# Patient Record
Sex: MALE | Race: WHITE | NOT HISPANIC OR LATINO | Employment: OTHER | URBAN - METROPOLITAN AREA
[De-identification: names, ages, dates, MRNs, and addresses within clinical notes are randomized per-mention and may not be internally consistent; named-entity substitution may affect disease eponyms.]

---

## 2023-10-06 NOTE — PROGRESS NOTES
Staff Physician: Lakia Tavarez MD PhD  Referring Physician: [email or web address]  Date of Service: 10/10/2023    RADIATION ONCOLOGY CONSULT NOTE    IDENTIFYING DATA:   Cancer Staging   Malignant neoplasm of prostate (CMS/HCC)  Staging form: Prostate, AJCC 8th Edition  - Clinical stage from 7/12/2023: Provider-entered Stage IIB (cT1c, cN0, PSA: 4.4, Grade Group: 2) - Signed by Lakia Tavarez MD PhD on 10/10/2023    Problem List Items Addressed This Visit       Malignant neoplasm of prostate (CMS/HCC) - Primary    Relevant Orders    Prostate Specific Antigen, Screen (Completed)     Mr. Thiago Paz is a 68-year-old with newly diagnosed prostate adenocarcinoma, self-referred, for evaluation and discussion of treatment recommendations.    3/29/2023 PSA 4.37    6/28/2023 MRI prostate noted a ~33cc prostate, with PI-RADS 4 lesion noted in the right posterolateral PZ, without evidence of gross MANDI, SVI, or abnormal lymphadenopathy    7/12/2023 MR targeted biopsy by Dr. Ni  noted GG2 (6/15 cores+, 0/1 targeted core+, no evidence of cribriform).     HISTORY OF PRESENT ILLNESS:  Today, Mr. Thiago Paz is here by himself. He reports that options discussed with him thus far include RP and brachytherapy at [facility]. He is specifically asking about the use of protons.     Symptomatically, he reports:    1.  Full independence in activities of daily living. Helps take care of his +89yo mother.  2.  No dyspnea on exertion.   3.  Normal appetite. No unintentional weight loss.   4.  Pain score: 0/10   5.  IPSS (International Prostate Symptom Score):   7 / 35, bother 1   - He is not experiencing urinary incontinence.    - He is not experiencing dysuria, hematuria, flank pain.   6.  Sexual function (JASKARAN) Score:  24 / 25    - Use of erectile dysfunction medications:  none  7.  Bowel function:  normal   - Denies hematochezia or pain.    - He does no have a history of hemorrhoids.    - He does not have a history of inflammatory bowel disease (Crohn's,  Ulcerative Colitis).    PAST MEDICAL HISTORY:  Past Medical History:   Diagnosis Date    A-fib (CMS/HCC)     GERD (gastroesophageal reflux disease)     Hypercholesteremia     Macular degeneration     Prostate cancer (CMS/HCC)      PAST SURGICAL HISTORY:  History reviewed. No pertinent surgical history.  ALLERGIES:  No Known Allergies  MEDICATIONS:    Current Outpatient Medications:     apixaban (Eliquis) 2.5 mg tablet, Take 1 tablet (2.5 mg) by mouth 2 times a day., Disp: , Rfl:     atorvastatin (Lipitor) 40 mg tablet, Take 1 tablet (40 mg) by mouth once daily., Disp: , Rfl:     DULoxetine (Cymbalta) 30 mg DR capsule, Take 1 capsule (30 mg) by mouth every other day. Do not crush or chew., Disp: , Rfl:     DULoxetine (Cymbalta) 60 mg DR capsule, Take 1 capsule (60 mg) by mouth every other day. Do not crush or chew., Disp: , Rfl:     metoprolol tartrate (Lopressor) 25 mg tablet, Take 1 tablet (25 mg) by mouth 2 times a day., Disp: , Rfl:     pantoprazole (ProtoNix) 20 mg EC tablet, Take 1 tablet (20 mg) by mouth once daily in the morning. Take before meals. Do not crush, chew, or split., Disp: , Rfl:     zolpidem (Ambien) 5 mg tablet, Take 1 tablet (5 mg) by mouth as needed at bedtime for sleep., Disp: , Rfl:    SOCIAL HISTORY:  Social History     Tobacco Use    Smoking status: Never    Smokeless tobacco: Never   Substance Use Topics    Alcohol use: Never     FAMILY HISTORY:  Family History   Problem Relation Name Age of Onset    Breast cancer Mother      Prostate cancer Father      Cancer Sister          doesn't know the type of cancer     REVIEW OF SYSTEMS:  A 10 point review of systems was reviewed with pertinent positives and negatives noted in HPI. All other systems have been reviewed and are negative.    RADIATION SCREENING QUESTIONS:  Prior radiation therapy: No  Pacemaker: No  Other implantable devices: No  Connective tissue disease: No    PHYSICAL EXAMINATION:  /80 (BP Location: Right arm, Patient  "Position: Sitting, BP Cuff Size: Large adult)   Pulse 74   Temp 36.3 °C (97.3 °F) (Skin)   Resp 18   Ht 1.803 m (5' 11\")   Wt 94.9 kg (209 lb 3.5 oz)   SpO2 96%   BMI 29.18 kg/m²     Constitutional: well developed, no distress, alert & cooperative  Eyes: pupils equal round and reactive to light, extraocular movements intact  Respiratory: normal work of breathing  Cardiovascular: regular, rate and rhythm  GI: abdomen is soft, non-tender, non-distended  : deferred   MSK: range of motion intact, no joint swelling, normal strength.   Extremities: no clubbing or edema.   Neurologic: alert and oriented. Cranial nerves II-XII grossly intact. Sensation intact, motor strength symmetric.    Psychological: normal affect      PERFORMANCE STATUS:  KPS/ECO, Fully active, able to carry on all pre-disease performed without restriction (ECOG equivalent 0)    LABORATORY AND IMAGING DATA:  Imaging: All imaging was personally reviewed and interpreted in clinic. Findings as per HPI and EMR.    Laboratory/Pathology:  All pertinent labs and pathology were personally reviewed and interpreted in clinic. Findings as per HPI and EMR.      2023 from [facility]    A. Prostate, left base, core biopsy:  - Prostatic adenocarcinoma, Winchester score 3+3=6 (Grade Group 1), involving 1 of 2 cores (1 mm tumor length, 5%).    B. Prostate, left mid, core biopsy:  - Prostatic adenocarcinoma, Swati score 3+3=6 (Grade Group 1), involving 1 of 2 cores (1 mm tumor length, 5%, with atypical glands/atypical small acinar proliferation in the other core).    C. Prostate, left apex, core biopsy:  - Benign prostatic tissue.    D. Prostate, right base, core biopsy:  - Prostatic adenocarcinoma, Winchester score 3+4=7 (Grade Group 2), involving 2 of 2 cores (5 mm tumor length, 45%; 6 mm tumor length, 50%).  - Percentage of Winchester pattern 4 = 20%.  - Borderline histology present (small cribriform).    E. Prostate, right mid, core biopsy:  - Prostatic " adenocarcinoma, Swati score 3+4=7 (Grade Group 2), involving 1 of 2 cores (6 mm tumor length, 60%).  - Percentage of Swati pattern 4 = 15%.    F. Prostate, right apex, core biopsy:  - Prostatic adenocarcinoma, Swati score 3+4=7 (Grade Group 2), involving 1 of 2 cores (8 mm tumor length, 80%).  - Percentage of Swati pattern 4 = 15%.    G. Prostate, target 1 right mid/base posterolateral peripheral zone, core biopsy:  - Benign prostatic tissue.      Prostate Cancer Biopsy Summary    Number of cores examined: 15  Number of cores positive: 6  Highest Grade Group: 2  Highest % of core involvement: 60% (6 mm)  Unfavorable histology: Absent  Borderline histology: Present  Large cribriform pattern 4: Absent  Intraductal carcinoma: Absent   ---    IMPRESSION:  67yo gentleman with a history of prostate adenocarcinoma, iPSA 4.37, GG2 (6/15 cores+, 0/1 targeted core+, no evidence of cribriform).     He falls into intermediate favorable risk prostate cancer based on his clinical-pathologic factors. He is not in any pain.    PLAN:  Per SSA tables, he has at least 10 years of life expectancy.   We discussed the recent update to the ProtecT randomized trial that compared 15-year outcomes for prostate cancer patients managed with active surveillance or definitive therapy and found that, while overall survival was no different, there were higher rates of distant metastases and disease progression with active surveillance (NEJ 2023), albeit the AS on that trial was less rigorous. He is a candidate for definitive management, including either surgery or radiation. We discussed that AS involves a secondary confirmatory biopsy, followed by routine PSA check, with surveillance imaging and repeat biopsies no more than once a year or if concern for progression by PSA.      I explained the logistics of radiation therapy, including CT simulation, the use of daily image guidance to improve prostate localization, and the recommendation  of fiducials and spaceOAR placement to reduce potential toxic rectal toxicities.  Potential acute and long-term side effects of radiation were discussed, including acute and late side effects, including toxicities to bladder, rectum, bowel, urethra, erectile dysfunction, infertility and risk of secondary malignancy.  I discussed various EBRT treatment regimens, including moderate hypofractionation over 20 treatments, ultra hypofractionation over 5 treatments (his IPSS is 7). He also asked about protons and I discussed the pros/cons of proton beam, including Forrest peak effect as well as range uncertainty and relative biological effectiveness and its variation across the spread-out Forrest peak.  Clinical outcomes suggest efficacy and toxicity of protons either advantageous or similar compared to intensity-modulated photon beam radiation but no randomized trial results are yet available.     He would like some time to consider his options before making a decision. We will re-check PSA today (previous was in 03/2023), and he will let us know his decision.     - check PSA today  - patient will call with his treatment decision     Lakia Tavarez MD PhD  , Radiation Oncology   Well appearing, awake, alert, oriented to person, place, time/situation and in no apparent distress. normal...

## 2023-10-10 ENCOUNTER — HOSPITAL ENCOUNTER (OUTPATIENT)
Dept: RADIATION ONCOLOGY | Facility: HOSPITAL | Age: 68
Setting detail: RADIATION/ONCOLOGY SERIES
Discharge: STILL ADMITTED OR ADMINISTRATIVE | End: 2023-10-10
Payer: MEDICARE

## 2023-10-10 ENCOUNTER — LAB (OUTPATIENT)
Dept: LAB | Facility: HOSPITAL | Age: 68
End: 2023-10-10
Payer: MEDICARE

## 2023-10-10 VITALS
OXYGEN SATURATION: 96 % | DIASTOLIC BLOOD PRESSURE: 80 MMHG | HEART RATE: 74 BPM | HEIGHT: 71 IN | WEIGHT: 209.22 LBS | TEMPERATURE: 97.3 F | BODY MASS INDEX: 29.29 KG/M2 | SYSTOLIC BLOOD PRESSURE: 129 MMHG | RESPIRATION RATE: 18 BRPM

## 2023-10-10 DIAGNOSIS — C61 MALIGNANT NEOPLASM OF PROSTATE (MULTI): Primary | ICD-10-CM

## 2023-10-10 DIAGNOSIS — C61 MALIGNANT NEOPLASM OF PROSTATE (MULTI): ICD-10-CM

## 2023-10-10 LAB — PSA SERPL-MCNC: 3.7 NG/ML

## 2023-10-10 PROCEDURE — 84153 ASSAY OF PSA TOTAL: CPT | Performed by: STUDENT IN AN ORGANIZED HEALTH CARE EDUCATION/TRAINING PROGRAM

## 2023-10-10 PROCEDURE — 99205 OFFICE O/P NEW HI 60 MIN: CPT | Performed by: STUDENT IN AN ORGANIZED HEALTH CARE EDUCATION/TRAINING PROGRAM

## 2023-10-10 PROCEDURE — 36415 COLL VENOUS BLD VENIPUNCTURE: CPT

## 2023-10-10 PROCEDURE — 99215 OFFICE O/P EST HI 40 MIN: CPT | Mod: PO | Performed by: STUDENT IN AN ORGANIZED HEALTH CARE EDUCATION/TRAINING PROGRAM

## 2023-10-10 RX ORDER — PANTOPRAZOLE SODIUM 20 MG/1
20 TABLET, DELAYED RELEASE ORAL
COMMUNITY

## 2023-10-10 RX ORDER — ATORVASTATIN CALCIUM 40 MG/1
40 TABLET, FILM COATED ORAL DAILY
COMMUNITY

## 2023-10-10 RX ORDER — DULOXETIN HYDROCHLORIDE 60 MG/1
60 CAPSULE, DELAYED RELEASE ORAL EVERY OTHER DAY
COMMUNITY

## 2023-10-10 RX ORDER — DULOXETIN HYDROCHLORIDE 30 MG/1
30 CAPSULE, DELAYED RELEASE ORAL EVERY OTHER DAY
COMMUNITY

## 2023-10-10 RX ORDER — METOPROLOL TARTRATE 25 MG/1
25 TABLET, FILM COATED ORAL 2 TIMES DAILY
COMMUNITY

## 2023-10-10 RX ORDER — ZOLPIDEM TARTRATE 5 MG/1
5 TABLET ORAL NIGHTLY PRN
COMMUNITY

## 2023-10-10 ASSESSMENT — PAIN SCALES - GENERAL: PAINLEVEL: 0-NO PAIN

## 2023-10-10 ASSESSMENT — PATIENT HEALTH QUESTIONNAIRE - PHQ9
2. FEELING DOWN, DEPRESSED OR HOPELESS: NOT AT ALL
SUM OF ALL RESPONSES TO PHQ9 QUESTIONS 1 AND 2: 0
1. LITTLE INTEREST OR PLEASURE IN DOING THINGS: NOT AT ALL

## 2023-10-10 ASSESSMENT — COLUMBIA-SUICIDE SEVERITY RATING SCALE - C-SSRS
2. HAVE YOU ACTUALLY HAD ANY THOUGHTS OF KILLING YOURSELF?: NO
1. IN THE PAST MONTH, HAVE YOU WISHED YOU WERE DEAD OR WISHED YOU COULD GO TO SLEEP AND NOT WAKE UP?: NO
6. HAVE YOU EVER DONE ANYTHING, STARTED TO DO ANYTHING, OR PREPARED TO DO ANYTHING TO END YOUR LIFE?: NO

## 2023-10-27 DIAGNOSIS — C61 CANCER OF PROSTATE WITH INTERMEDIATE RECURRENCE RISK, STAGE T2B-C OR GLEASON 7 OR PROSTATE-SPECIFIC ANTIGEN (PSA) 10-20 (MULTI): Primary | ICD-10-CM

## 2023-10-30 PROBLEM — C61: Status: ACTIVE | Noted: 2023-10-27

## 2023-11-08 ENCOUNTER — PRE-ADMISSION TESTING (OUTPATIENT)
Dept: PREADMISSION TESTING | Facility: HOSPITAL | Age: 68
End: 2023-11-08
Payer: MEDICARE

## 2023-11-08 ENCOUNTER — HOSPITAL ENCOUNTER (OUTPATIENT)
Dept: CARDIOLOGY | Facility: HOSPITAL | Age: 68
Discharge: HOME | End: 2023-11-08
Payer: MEDICARE

## 2023-11-08 ENCOUNTER — OFFICE VISIT (OUTPATIENT)
Dept: UROLOGY | Facility: CLINIC | Age: 68
End: 2023-11-08
Payer: MEDICARE

## 2023-11-08 VITALS
DIASTOLIC BLOOD PRESSURE: 95 MMHG | OXYGEN SATURATION: 100 % | WEIGHT: 210.3 LBS | RESPIRATION RATE: 16 BRPM | HEIGHT: 71 IN | TEMPERATURE: 98.2 F | HEART RATE: 60 BPM | BODY MASS INDEX: 29.44 KG/M2 | SYSTOLIC BLOOD PRESSURE: 171 MMHG

## 2023-11-08 VITALS — SYSTOLIC BLOOD PRESSURE: 161 MMHG | DIASTOLIC BLOOD PRESSURE: 90 MMHG

## 2023-11-08 DIAGNOSIS — C61 MALIGNANT NEOPLASM OF PROSTATE (MULTI): Primary | ICD-10-CM

## 2023-11-08 DIAGNOSIS — I48.91 ATRIAL FIBRILLATION, UNSPECIFIED TYPE (MULTI): ICD-10-CM

## 2023-11-08 DIAGNOSIS — E78.00 ELEVATED CHOLESTEROL: ICD-10-CM

## 2023-11-08 DIAGNOSIS — I10 HTN (HYPERTENSION), BENIGN: ICD-10-CM

## 2023-11-08 LAB
ANION GAP SERPL CALC-SCNC: 11 MMOL/L (ref 10–20)
BUN SERPL-MCNC: 21 MG/DL (ref 6–23)
CALCIUM SERPL-MCNC: 9.2 MG/DL (ref 8.6–10.3)
CHLORIDE SERPL-SCNC: 108 MMOL/L (ref 98–107)
CO2 SERPL-SCNC: 28 MMOL/L (ref 21–32)
CREAT SERPL-MCNC: 0.93 MG/DL (ref 0.5–1.3)
ERYTHROCYTE [DISTWIDTH] IN BLOOD BY AUTOMATED COUNT: 13.4 % (ref 11.5–14.5)
GFR SERPL CREATININE-BSD FRML MDRD: 89 ML/MIN/1.73M*2
GLUCOSE SERPL-MCNC: 101 MG/DL (ref 74–99)
HCT VFR BLD AUTO: 42.9 % (ref 41–52)
HGB BLD-MCNC: 14.8 G/DL (ref 13.5–17.5)
MCH RBC QN AUTO: 31.8 PG (ref 26–34)
MCHC RBC AUTO-ENTMCNC: 34.5 G/DL (ref 32–36)
MCV RBC AUTO: 92 FL (ref 80–100)
NRBC BLD-RTO: 0 /100 WBCS (ref 0–0)
PLATELET # BLD AUTO: 222 X10*3/UL (ref 150–450)
POTASSIUM SERPL-SCNC: 3.8 MMOL/L (ref 3.5–5.3)
RBC # BLD AUTO: 4.65 X10*6/UL (ref 4.5–5.9)
SODIUM SERPL-SCNC: 143 MMOL/L (ref 136–145)
WBC # BLD AUTO: 5.9 X10*3/UL (ref 4.4–11.3)

## 2023-11-08 PROCEDURE — 99204 OFFICE O/P NEW MOD 45 MIN: CPT | Performed by: STUDENT IN AN ORGANIZED HEALTH CARE EDUCATION/TRAINING PROGRAM

## 2023-11-08 PROCEDURE — 93005 ELECTROCARDIOGRAM TRACING: CPT

## 2023-11-08 PROCEDURE — 1126F AMNT PAIN NOTED NONE PRSNT: CPT | Performed by: STUDENT IN AN ORGANIZED HEALTH CARE EDUCATION/TRAINING PROGRAM

## 2023-11-08 PROCEDURE — 36415 COLL VENOUS BLD VENIPUNCTURE: CPT

## 2023-11-08 PROCEDURE — 85027 COMPLETE CBC AUTOMATED: CPT | Performed by: CLINICAL NURSE SPECIALIST

## 2023-11-08 PROCEDURE — 93010 ELECTROCARDIOGRAM REPORT: CPT | Performed by: INTERNAL MEDICINE

## 2023-11-08 PROCEDURE — 1159F MED LIST DOCD IN RCRD: CPT | Performed by: STUDENT IN AN ORGANIZED HEALTH CARE EDUCATION/TRAINING PROGRAM

## 2023-11-08 PROCEDURE — 99202 OFFICE O/P NEW SF 15 MIN: CPT | Performed by: CLINICAL NURSE SPECIALIST

## 2023-11-08 PROCEDURE — 1036F TOBACCO NON-USER: CPT | Performed by: STUDENT IN AN ORGANIZED HEALTH CARE EDUCATION/TRAINING PROGRAM

## 2023-11-08 PROCEDURE — 82374 ASSAY BLOOD CARBON DIOXIDE: CPT | Performed by: CLINICAL NURSE SPECIALIST

## 2023-11-08 RX ORDER — GLUCOSAM/CHONDRO/HERB 149/HYAL 750-100 MG
TABLET ORAL
COMMUNITY

## 2023-11-08 RX ORDER — DEXTROMETHORPHAN HYDROBROMIDE, GUAIFENESIN 5; 100 MG/5ML; MG/5ML
650 LIQUID ORAL EVERY 8 HOURS PRN
COMMUNITY

## 2023-11-08 ASSESSMENT — DUKE ACTIVITY SCORE INDEX (DASI)
CAN YOU HAVE SEXUAL RELATIONS: YES
CAN YOU DO YARD WORK LIKE RAKING LEAVES, WEEDING OR PUSHING A MOWER: YES
CAN YOU PARTICIPATE IN STRENOUS SPORTS LIKE SWIMMING, SINGLES TENNIS, FOOTBALL, BASKETBALL, OR SKIING: YES
CAN YOU RUN A SHORT DISTANCE: YES
CAN YOU DO LIGHT WORK AROUND THE HOUSE LIKE DUSTING OR WASHING DISHES: YES
CAN YOU WALK INDOORS, SUCH AS AROUND YOUR HOUSE: YES
CAN YOU DO HEAVY WORK AROUND THE HOUSE LIKE SCRUBBING FLOORS OR LIFTING AND MOVING HEAVY FURNITURE: YES
CAN YOU PARTICIPATE IN MODERATE RECREATIONAL ACTIVITIES LIKE GOLF, BOWLING, DANCING, DOUBLES TENNIS OR THROWING A BASEBALL OR FOOTBALL: YES
CAN YOU CLIMB A FLIGHT OF STAIRS OR WALK UP A HILL: YES
CAN YOU DO MODERATE WORK AROUND THE HOUSE LIKE VACUUMING, SWEEPING FLOORS OR CARRYING GROCERIES: YES
CAN YOU WALK A BLOCK OR TWO ON LEVEL GROUND: YES

## 2023-11-08 ASSESSMENT — ENCOUNTER SYMPTOMS
MUSCULOSKELETAL NEGATIVE: 1
ENDOCRINE NEGATIVE: 1
PSYCHIATRIC NEGATIVE: 1
NEUROLOGICAL NEGATIVE: 1
CARDIOVASCULAR NEGATIVE: 1
GASTROINTESTINAL NEGATIVE: 1
HEMATOLOGIC/LYMPHATIC NEGATIVE: 1
RESPIRATORY NEGATIVE: 1
CONSTITUTIONAL NEGATIVE: 1
EYES NEGATIVE: 1
ALLERGIC/IMMUNOLOGIC NEGATIVE: 1

## 2023-11-08 ASSESSMENT — CHADS2 SCORE
CHADS2 SCORE: 1
DIABETES: NO
HYPERTENSION: YES
AGE GREATER THAN OR EQUAL TO 75: NO
PRIOR STROKE OR TIA OR THROMBOEMBOLISM: NO
CHF: NO

## 2023-11-08 ASSESSMENT — LIFESTYLE VARIABLES: SMOKING_STATUS: NONSMOKER

## 2023-11-08 NOTE — PROGRESS NOTES
[facility] - Dr. Yunier Leo    New Patient Visit    PCP: Aryan Fleming DO    Chief Complaint/Reason for visit: REF from Dr. Tavarez for SpaceOAR    HPI:  Here to discuss SpaceOAR  Still having hematospermia from biopsy  No new medical issues  On Eliquis  Had PAT visit today     Dr. Tavarez visit:  Mr. Thiago Paz is a 68-year-old with newly diagnosed prostate adenocarcinoma, self-referred, for evaluation and discussion of treatment recommendations.     3/29/2023 PSA 4.37     6/28/2023 MRI prostate noted a ~33cc prostate, with PI-RADS 4 lesion noted in the right posterolateral PZ, without evidence of gross MANDI, SVI, or abnormal lymphadenopathy     7/12/2023 MR targeted biopsy by Dr. Ni  noted GG2 (6/15 cores+, 0/1 targeted core+, no evidence of cribriform).        Past Medical History:   Diagnosis Date    A-fib (CMS/HCC)     Arrhythmia     Depression     GERD (gastroesophageal reflux disease)     Hypercholesteremia     Macular degeneration     Prostate cancer (CMS/HCC)      Past Surgical History:   Procedure Laterality Date    HERNIA REPAIR      KIDNEY STONE SURGERY      MASS EXCISION      OTHER SURGICAL HISTORY      removal of Thymus gland    UPPER GASTROINTESTINAL ENDOSCOPY       Social History     Socioeconomic History    Marital status: Single     Spouse name: Not on file    Number of children: Not on file    Years of education: Not on file    Highest education level: Not on file   Occupational History    Not on file   Tobacco Use    Smoking status: Never    Smokeless tobacco: Never   Vaping Use    Vaping Use: Never used   Substance and Sexual Activity    Alcohol use: Never    Drug use: Never    Sexual activity: Defer   Other Topics Concern    Not on file   Social History Narrative    Not on file     Social Determinants of Health     Financial Resource Strain: Not on file   Food Insecurity: Not on file   Transportation Needs: Not on file   Physical Activity: Not on file   Stress: Not on file   Social  Connections: Not on file   Intimate Partner Violence: Not on file   Housing Stability: Not on file     Current Outpatient Medications   Medication Instructions    acetaminophen (TYLENOL 8 HOUR) 650 mg, oral, Every 8 hours PRN, Do not crush, chew, or split.    apixaban (ELIQUIS) 2.5 mg, oral, 2 times daily    atorvastatin (LIPITOR) 40 mg, oral, Daily    DULoxetine (CYMBALTA) 30 mg, oral, Every other day, Do not crush or chew.    DULoxetine (CYMBALTA) 60 mg, oral, Every other day, Do not crush or chew.    metoprolol tartrate (LOPRESSOR) 25 mg, oral, 2 times daily    omega 3-dha-epa-fish oil (Fish OiL) 1,000 mg (120 mg-180 mg) capsule oral    pantoprazole (PROTONIX) 20 mg, oral, Daily before breakfast, Do not crush, chew, or split.    zolpidem (AMBIEN) 5 mg, oral, Nightly PRN     No Known Allergies       Physical Exam:  General: Alert, cooperative, no acute distress  Eyes: Sclera clear  Cardiac: Extremities are warm and well perfused  Lungs: Breathing non-labored. Speaking in clear and complete sentences.  MSK: Ambulatory with steady gait   Neuro: Alert and oriented to person, place, and time  Psych: Normal mood and affect  Skin: No obvious lesions or rashes    Assessment and Plan:  1. Malignant neoplasm of prostate (CMS/HCC)  Discussed in detail the risks of significant, benefits, and alternatives of SpaceOAR and fiducial marker placement. Risks including pain, bleeding, infection, rectal injury discussed.  This will be an outpatient procedure under light sedation.  Plan for treatment planning CT with radiation oncology about 2 weeks after procedure.

## 2023-11-08 NOTE — H&P
History Of Present Illness  Thiago Paz is a 68 y.o. very pleasant male presenting with Hx prostate cancer, Scheduled for SpaceOAR and fudicial placement under MAC/General anesthesia per Dr. Leo on 11/16/23.      Past Medical History  Past Medical History:   Diagnosis Date    A-fib (CMS/HCC)     Arrhythmia     Depression     GERD (gastroesophageal reflux disease)     Hypercholesteremia     Macular degeneration     Prostate cancer (CMS/HCC)        Surgical History  Past Surgical History:   Procedure Laterality Date    HERNIA REPAIR      KIDNEY STONE SURGERY      MASS EXCISION      OTHER SURGICAL HISTORY      removal of Thymus gland    UPPER GASTROINTESTINAL ENDOSCOPY          Social History  He reports that he has never smoked. He has never used smokeless tobacco. He reports that he does not drink alcohol and does not use drugs.    Family History  Family History   Problem Relation Name Age of Onset    Breast cancer Mother      Prostate cancer Father      Cancer Sister          doesn't know the type of cancer        Allergies  Patient has no known allergies.    Review of Systems   Constitutional: Negative.    HENT: Negative.     Eyes: Negative.    Respiratory: Negative.     Cardiovascular: Negative.    Gastrointestinal: Negative.    Endocrine: Negative.    Genitourinary: Negative.         Prostate Cancer   Musculoskeletal: Negative.    Skin: Negative.    Allergic/Immunologic: Negative.    Neurological: Negative.    Hematological: Negative.    Psychiatric/Behavioral: Negative.     All other systems reviewed and are negative.       Physical Exam  Vitals and nursing note reviewed.   Constitutional:       Appearance: Normal appearance.   HENT:      Head: Normocephalic.      Nose: Nose normal.      Mouth/Throat:      Mouth: Mucous membranes are moist.      Pharynx: Oropharynx is clear.      Comments: Mallampati 1, finger breadth 3  Full Neck ROM  No dentures or partials.   Eyes:      Pupils: Pupils are equal, round,  "and reactive to light.   Cardiovascular:      Rate and Rhythm: Normal rate and regular rhythm.      Heart sounds: Normal heart sounds, S1 normal and S2 normal.   Pulmonary:      Effort: Pulmonary effort is normal.      Breath sounds: Normal breath sounds.      Comments: Lungs clear  Abdominal:      General: Bowel sounds are normal.      Palpations: Abdomen is soft.   Musculoskeletal:         General: Normal range of motion.      Cervical back: Normal range of motion and neck supple.      Right lower leg: No edema.      Left lower leg: No edema.   Skin:     General: Skin is warm and dry.   Neurological:      General: No focal deficit present.      Mental Status: He is alert and oriented to person, place, and time.   Psychiatric:         Mood and Affect: Mood normal.         Behavior: Behavior normal.         Thought Content: Thought content normal.         Judgment: Judgment normal.          Last Recorded Vitals  Blood pressure (!) 171/95, pulse 60, temperature 36.8 °C (98.2 °F), temperature source Temporal, resp. rate 16, height 1.803 m (5' 11\"), weight 95.4 kg (210 lb 4.8 oz), SpO2 100 %.    Relevant Results    Current Outpatient Medications:     acetaminophen (Tylenol 8 HOUR) 650 mg ER tablet, Take 1 tablet (650 mg) by mouth every 8 hours if needed for mild pain (1 - 3). Do not crush, chew, or split., Disp: , Rfl:     apixaban (Eliquis) 2.5 mg tablet, Take 1 tablet (2.5 mg) by mouth 2 times a day., Disp: , Rfl:     atorvastatin (Lipitor) 40 mg tablet, Take 1 tablet (40 mg) by mouth once daily., Disp: , Rfl:     DULoxetine (Cymbalta) 30 mg DR capsule, Take 1 capsule (30 mg) by mouth every other day. Do not crush or chew., Disp: , Rfl:     DULoxetine (Cymbalta) 60 mg DR capsule, Take 1 capsule (60 mg) by mouth every other day. Do not crush or chew., Disp: , Rfl:     metoprolol tartrate (Lopressor) 25 mg tablet, Take 1 tablet (25 mg) by mouth 2 times a day., Disp: , Rfl:     omega 3-dha-epa-fish oil (Fish OiL) 1,000 " mg (120 mg-180 mg) capsule, Take by mouth., Disp: , Rfl:     pantoprazole (ProtoNix) 20 mg EC tablet, Take 1 tablet (20 mg) by mouth once daily in the morning. Take before meals. Do not crush, chew, or split., Disp: , Rfl:     zolpidem (Ambien) 5 mg tablet, Take 1 tablet (5 mg) by mouth as needed at bedtime for sleep., Disp: , Rfl:      No results found for this or any previous visit (from the past 24 hour(s)).             Assessment/Plan   Problem List Items Addressed This Visit             ICD-10-CM    Malignant neoplasm of prostate (CMS/HCC) - Primary C61    Relevant Orders    Basic Metabolic Panel    CBC     Other Visit Diagnoses         Codes    Atrial fibrillation, unspecified type (CMS/HCC)     I48.91    Relevant Orders    ECG 12 Lead    HTN (hypertension), benign     I10    Relevant Orders    Basic Metabolic Panel    CBC    ECG 12 Lead    Elevated cholesterol     E78.00            Hx prostate cancer, Scheduled for SpaceOAR and fudicial placement under MAC/General anesthesia per Dr. Leo on 11/16/23.   CBC, BMP, EKG Ordered. Labs pending. EKG is SR today with 1st degree AV block  All surgery instructions and discharge paperwork provided. All questions answered. Patient verbalized understanding   Plan will be to follow up with urology group post op.  Hx Afib  SR on today's EKG  Patient plans to discuss stop date for Eliquis with Dr. Leo at his urology appointment today.        I spent 15 minutes in the professional and overall care of this patient.      Denise Castanon, APRN-CNS

## 2023-11-08 NOTE — CPM/PAT H&P
CPM/PAT Evaluation       Name: Thiago Paz (Thiago Paz)  /Age: 1955/68 y.o.     In-Person       Chief Complaint: Hx prostate cancer, Scheduled for SpaceOAR and fudicial placement under MAC/General anesthesia per Dr. Leo on 23.         Past Medical History:   Diagnosis Date    A-fib (CMS/HCC)     Arrhythmia     Depression     GERD (gastroesophageal reflux disease)     Hypercholesteremia     Macular degeneration     Prostate cancer (CMS/HCC)        Past Surgical History:   Procedure Laterality Date    HERNIA REPAIR      KIDNEY STONE SURGERY      MASS EXCISION         Patient Sexual activity questions deferred to the physician.    Family History   Problem Relation Name Age of Onset    Breast cancer Mother      Prostate cancer Father      Cancer Sister          doesn't know the type of cancer       No Known Allergies    Prior to Admission medications    Medication Sig Start Date End Date Taking? Authorizing Provider   acetaminophen (Tylenol 8 HOUR) 650 mg ER tablet Take 1 tablet (650 mg) by mouth every 8 hours if needed for mild pain (1 - 3). Do not crush, chew, or split.    Historical Provider, MD   apixaban (Eliquis) 2.5 mg tablet Take 1 tablet (2.5 mg) by mouth 2 times a day.    Historical Provider, MD   atorvastatin (Lipitor) 40 mg tablet Take 1 tablet (40 mg) by mouth once daily.    Historical Provider, MD   DULoxetine (Cymbalta) 30 mg DR capsule Take 1 capsule (30 mg) by mouth every other day. Do not crush or chew.    Historical Provider, MD   DULoxetine (Cymbalta) 60 mg DR capsule Take 1 capsule (60 mg) by mouth every other day. Do not crush or chew.    Historical Provider, MD   metoprolol tartrate (Lopressor) 25 mg tablet Take 1 tablet (25 mg) by mouth 2 times a day.    Historical Provider, MD   omega 3-dha-epa-fish oil (Fish OiL) 1,000 mg (120 mg-180 mg) capsule Take by mouth.    Historical Provider, MD   pantoprazole (ProtoNix) 20 mg EC tablet Take 1 tablet (20 mg) by mouth once daily in  the morning. Take before meals. Do not crush, chew, or split.    Historical Provider, MD   zolpidem (Ambien) 5 mg tablet Take 1 tablet (5 mg) by mouth as needed at bedtime for sleep.    Historical Provider, MD PROCTOR AIRWAY:   Airway:     Mallampati::  I    Neck ROM::  Full  normal        Visit Vitals  BP (!) 171/95 Comment: was reported to GREGORY Muñoz APRN   Pulse 60   Temp 36.8 °C (98.2 °F) (Temporal)   Resp 16       DASI Risk Score    No data to display       Caprini DVT Assessment      Flowsheet Row Most Recent Value   DVT Score 7   Labs/Test Results --  [on eliquis, hx of AFib]   Current Status Major surgery planned, including arthroscopic and laproscopic (1-2 hours), Minor surgery planned   History Prior major surgery   Age 60-75 years   BMI 30 or less          Modified Frailty Index    No data to display       CHADS2 Stroke Risk  Current as of 21 minutes ago        2.8% 3 - 100%: High Risk   2 - 3%: Medium Risk   0 - 2%: Low Risk     No Change          This score determines the patient's risk of having a stroke if the patient has atrial fibrillation.          Points Metrics   0 Has Congestive Heart Failure:  No     Patients with congestive heart failure get 1 point.    Current as of 21 minutes ago   1 Has Hypertension:  Yes     Patients with hypertension get 1 point.    Current as of 21 minutes ago   0 Age:  68     Patients who are 75 years of age or older get 1 point.    Current as of 21 minutes ago   0 Has Diabetes:  No     Patients with diabetes get 1 point.    Current as of 21 minutes ago   0 Had Stroke:  No  Had TIA:  No  Had Thromboembolism:  No     Patients who have had a stroke, TIA, or thromboembolism get 2 points.    Current as of 21 minutes ago             Revised Cardiac Risk Index      Flowsheet Row Most Recent Value   Revised Cardiac Risk Calculator 0          Apfel Simplified Score      Flowsheet Row Most Recent Value   Apfel Simplified Score Calculator 2          Risk Analysis Index  Results This Encounter    No data found in the last 1 encounters.       Stop Bang Score      Flowsheet Row Most Recent Value   Do you snore loudly? 0   Do you often feel tired or fatigued after your sleep? 0   Has anyone ever observed you stop breathing in your sleep? 0   Do you have or are you being treated for high blood pressure? 1   Recent BMI (Calculated) 29.2   Is BMI greater than 35 kg/m2? 0=No   Age older than 50 years old? 1=Yes   Is your neck circumference greater than 17 inches (Male) or 16 inches (Female)? 0   Gender - Male 1=Yes   STOP-BANG Total Score 3            Assessment and Plan:     Other: Hx prostate cancer, Scheduled for SpaceOAR and fudicial placement under MAC/General anesthesia per Dr. Leo on 11/16/23.  See H&P, Scheduled to follow up with Urology service post operatively.

## 2023-11-08 NOTE — PREPROCEDURE INSTRUCTIONS
Medication List            Accurate as of November 8, 2023 12:54 PM. Always use your most recent med list.                acetaminophen 650 mg ER tablet  Commonly known as: Tylenol 8 HOUR     Ambien 5 mg tablet  Generic drug: zolpidem     atorvastatin 40 mg tablet  Commonly known as: Lipitor     * DULoxetine 30 mg DR capsule  Commonly known as: Cymbalta     * DULoxetine 60 mg DR capsule  Commonly known as: Cymbalta     Eliquis 2.5 mg tablet  Generic drug: apixaban     Fish OiL 1,000 mg (120 mg-180 mg) capsule  Generic drug: omega 3-dha-epa-fish oil     metoprolol tartrate 25 mg tablet  Commonly known as: Lopressor     pantoprazole 20 mg EC tablet  Commonly known as: ProtoNix           * This list has 2 medication(s) that are the same as other medications prescribed for you. Read the directions carefully, and ask your doctor or other care provider to review them with you.                       NPO Instructions:     Do not drink any liquid after midnight the night before your surgery  Do not eat any food after midnight the night before your surgery/procedure.  Candy, gum, mints and smoking of cigarettes, marijuana or vaping is not permitted after midnight prior to your surgery   Do not drink Alcohol 24 hours prior to surgery      Increase fluid intake day before surgery    Additional Instructions:        Wear  comfortable loose fitting clothing  Do not use moisturizers, creams, lotions or perfume  All jewelry and valuables should be left at home. May bring glasses and partials.    Stop blood thinning medications as instructed by ordering physician or surgeon    Shower or bathe the night before and day of surgery use antimicrobial scrub as ordered. Brush teeth and avoid perfumes, colognes, powders, makeup, aftershave and hair spray    Go to Registration, in the main lobby, upon arrival on the day of surgery and have 's license and medical insurance card available.    Please have a responsible adult to drive  you home and be available to help you as needed after surgery.           NPO Instructions:    Do not eat any food after midnight the night before your surgery/procedure.    Additional Instructions:     The Day before Surgery:  You will be contacted regarding the time of your arrival to facility and surgery time  Day of Surgery:  Review your medication instructions, take indicated medications  Wear  comfortable loose fitting clothing  Do not use moisturizers, creams, lotions or perfume  All jewelry and valuables should be left at home

## 2023-11-09 LAB
ATRIAL RATE: 60 BPM
P AXIS: 11 DEGREES
PR INTERVAL: 245 MS
Q ONSET: 249 MS
QRS COUNT: 9 BEATS
QRS DURATION: 102 MS
QT INTERVAL: 420 MS
QTC CALCULATION(BAZETT): 423 MS
QTC FREDERICIA: 422 MS
R AXIS: 11 DEGREES
T AXIS: 29 DEGREES
T OFFSET: 459 MS
VENTRICULAR RATE: 61 BPM

## 2023-11-13 ENCOUNTER — ANESTHESIA EVENT (OUTPATIENT)
Dept: OPERATING ROOM | Facility: HOSPITAL | Age: 68
End: 2023-11-13
Payer: MEDICARE

## 2023-11-14 ENCOUNTER — TELEPHONE (OUTPATIENT)
Dept: UROLOGY | Facility: CLINIC | Age: 68
End: 2023-11-14
Payer: MEDICARE

## 2023-11-16 ENCOUNTER — ANESTHESIA (OUTPATIENT)
Dept: OPERATING ROOM | Facility: HOSPITAL | Age: 68
End: 2023-11-16
Payer: MEDICARE

## 2023-11-16 ENCOUNTER — APPOINTMENT (OUTPATIENT)
Dept: CARDIOLOGY | Facility: HOSPITAL | Age: 68
End: 2023-11-16
Payer: MEDICARE

## 2023-11-16 ENCOUNTER — HOSPITAL ENCOUNTER (OUTPATIENT)
Facility: HOSPITAL | Age: 68
Setting detail: OUTPATIENT SURGERY
Discharge: HOME | End: 2023-11-16
Attending: STUDENT IN AN ORGANIZED HEALTH CARE EDUCATION/TRAINING PROGRAM | Admitting: STUDENT IN AN ORGANIZED HEALTH CARE EDUCATION/TRAINING PROGRAM
Payer: MEDICARE

## 2023-11-16 VITALS
HEART RATE: 55 BPM | RESPIRATION RATE: 20 BRPM | SYSTOLIC BLOOD PRESSURE: 134 MMHG | TEMPERATURE: 97.8 F | DIASTOLIC BLOOD PRESSURE: 75 MMHG | OXYGEN SATURATION: 95 %

## 2023-11-16 DIAGNOSIS — C61 CANCER OF PROSTATE WITH INTERMEDIATE RECURRENCE RISK, STAGE T2B-C OR GLEASON 7 OR PROSTATE-SPECIFIC ANTIGEN (PSA) 10-20 (MULTI): Primary | ICD-10-CM

## 2023-11-16 PROBLEM — E78.5 HYPERLIPIDEMIA: Status: ACTIVE | Noted: 2023-11-16

## 2023-11-16 PROBLEM — H54.7 VISION LOSS: Status: ACTIVE | Noted: 2023-11-16

## 2023-11-16 PROBLEM — K21.9 GASTROESOPHAGEAL REFLUX DISEASE: Status: ACTIVE | Noted: 2023-11-16

## 2023-11-16 PROBLEM — F41.9 ANXIETY: Status: ACTIVE | Noted: 2023-11-16

## 2023-11-16 PROBLEM — R94.31 ABNORMAL EKG: Status: ACTIVE | Noted: 2023-11-16

## 2023-11-16 PROBLEM — I48.91 ATRIAL FIBRILLATION (MULTI): Status: ACTIVE | Noted: 2023-11-16

## 2023-11-16 LAB
ATRIAL RATE: 51 BPM
P AXIS: 65 DEGREES
PR INTERVAL: 228 MS
Q ONSET: 249 MS
QRS COUNT: 8 BEATS
QRS DURATION: 103 MS
QT INTERVAL: 455 MS
QTC CALCULATION(BAZETT): 420 MS
QTC FREDERICIA: 431 MS
R AXIS: 19 DEGREES
T AXIS: 33 DEGREES
T OFFSET: 477 MS
VENTRICULAR RATE: 51 BPM

## 2023-11-16 PROCEDURE — 7100000010 HC PHASE TWO TIME - EACH INCREMENTAL 1 MINUTE: Performed by: STUDENT IN AN ORGANIZED HEALTH CARE EDUCATION/TRAINING PROGRAM

## 2023-11-16 PROCEDURE — 2720000007 HC OR 272 NO HCPCS: Performed by: STUDENT IN AN ORGANIZED HEALTH CARE EDUCATION/TRAINING PROGRAM

## 2023-11-16 PROCEDURE — 93005 ELECTROCARDIOGRAM TRACING: CPT

## 2023-11-16 PROCEDURE — 7100000002 HC RECOVERY ROOM TIME - EACH INCREMENTAL 1 MINUTE: Performed by: STUDENT IN AN ORGANIZED HEALTH CARE EDUCATION/TRAINING PROGRAM

## 2023-11-16 PROCEDURE — 2780000003 HC OR 278 NO HCPCS: Performed by: STUDENT IN AN ORGANIZED HEALTH CARE EDUCATION/TRAINING PROGRAM

## 2023-11-16 PROCEDURE — 2500000004 HC RX 250 GENERAL PHARMACY W/ HCPCS (ALT 636 FOR OP/ED): Performed by: NURSE ANESTHETIST, CERTIFIED REGISTERED

## 2023-11-16 PROCEDURE — 3700000001 HC GENERAL ANESTHESIA TIME - INITIAL BASE CHARGE: Performed by: STUDENT IN AN ORGANIZED HEALTH CARE EDUCATION/TRAINING PROGRAM

## 2023-11-16 PROCEDURE — 3700000002 HC GENERAL ANESTHESIA TIME - EACH INCREMENTAL 1 MINUTE: Performed by: STUDENT IN AN ORGANIZED HEALTH CARE EDUCATION/TRAINING PROGRAM

## 2023-11-16 PROCEDURE — 3600000004 HC OR TIME - INITIAL BASE CHARGE - PROCEDURE LEVEL FOUR: Performed by: STUDENT IN AN ORGANIZED HEALTH CARE EDUCATION/TRAINING PROGRAM

## 2023-11-16 PROCEDURE — 2500000004 HC RX 250 GENERAL PHARMACY W/ HCPCS (ALT 636 FOR OP/ED): Performed by: STUDENT IN AN ORGANIZED HEALTH CARE EDUCATION/TRAINING PROGRAM

## 2023-11-16 PROCEDURE — 7100000001 HC RECOVERY ROOM TIME - INITIAL BASE CHARGE: Performed by: STUDENT IN AN ORGANIZED HEALTH CARE EDUCATION/TRAINING PROGRAM

## 2023-11-16 PROCEDURE — 55874 TPRNL PLMT BIODEGRDABL MATRL: CPT | Performed by: STUDENT IN AN ORGANIZED HEALTH CARE EDUCATION/TRAINING PROGRAM

## 2023-11-16 PROCEDURE — 2500000005 HC RX 250 GENERAL PHARMACY W/O HCPCS: Performed by: STUDENT IN AN ORGANIZED HEALTH CARE EDUCATION/TRAINING PROGRAM

## 2023-11-16 PROCEDURE — 93010 ELECTROCARDIOGRAM REPORT: CPT | Performed by: INTERNAL MEDICINE

## 2023-11-16 PROCEDURE — 2500000005 HC RX 250 GENERAL PHARMACY W/O HCPCS: Performed by: NURSE ANESTHETIST, CERTIFIED REGISTERED

## 2023-11-16 PROCEDURE — 3600000009 HC OR TIME - EACH INCREMENTAL 1 MINUTE - PROCEDURE LEVEL FOUR: Performed by: STUDENT IN AN ORGANIZED HEALTH CARE EDUCATION/TRAINING PROGRAM

## 2023-11-16 PROCEDURE — 7100000009 HC PHASE TWO TIME - INITIAL BASE CHARGE: Performed by: STUDENT IN AN ORGANIZED HEALTH CARE EDUCATION/TRAINING PROGRAM

## 2023-11-16 PROCEDURE — 55876 PLACE RT DEVICE/MARKER PROS: CPT | Performed by: STUDENT IN AN ORGANIZED HEALTH CARE EDUCATION/TRAINING PROGRAM

## 2023-11-16 PROCEDURE — A4648 IMPLANTABLE TISSUE MARKER: HCPCS | Performed by: STUDENT IN AN ORGANIZED HEALTH CARE EDUCATION/TRAINING PROGRAM

## 2023-11-16 DEVICE — STERILE PLACEMENT NEEDLES (17GA ETW X 20CM) WITH BONE WAX AND (1.2 X 3MM) SOFT TISSUE GOLD MARKER [3]
Type: IMPLANTABLE DEVICE | Site: PROSTATE | Status: FUNCTIONAL
Brand: FIDUCIAL MARKER KIT

## 2023-11-16 RX ORDER — SODIUM CHLORIDE, SODIUM LACTATE, POTASSIUM CHLORIDE, CALCIUM CHLORIDE 600; 310; 30; 20 MG/100ML; MG/100ML; MG/100ML; MG/100ML
50 INJECTION, SOLUTION INTRAVENOUS CONTINUOUS
Status: DISCONTINUED | OUTPATIENT
Start: 2023-11-16 | End: 2023-11-16 | Stop reason: HOSPADM

## 2023-11-16 RX ORDER — SODIUM CITRATE AND CITRIC ACID MONOHYDRATE 334; 500 MG/5ML; MG/5ML
30 SOLUTION ORAL ONCE
Status: DISCONTINUED | OUTPATIENT
Start: 2023-11-16 | End: 2023-11-16 | Stop reason: HOSPADM

## 2023-11-16 RX ORDER — PROPOFOL 10 MG/ML
INJECTION, EMULSION INTRAVENOUS AS NEEDED
Status: DISCONTINUED | OUTPATIENT
Start: 2023-11-16 | End: 2023-11-16

## 2023-11-16 RX ORDER — MIDAZOLAM HYDROCHLORIDE 1 MG/ML
INJECTION, SOLUTION INTRAMUSCULAR; INTRAVENOUS AS NEEDED
Status: DISCONTINUED | OUTPATIENT
Start: 2023-11-16 | End: 2023-11-16

## 2023-11-16 RX ORDER — MORPHINE SULFATE 2 MG/ML
2 INJECTION, SOLUTION INTRAMUSCULAR; INTRAVENOUS EVERY 5 MIN PRN
Status: DISCONTINUED | OUTPATIENT
Start: 2023-11-16 | End: 2023-11-16 | Stop reason: HOSPADM

## 2023-11-16 RX ORDER — FENTANYL CITRATE 50 UG/ML
INJECTION, SOLUTION INTRAMUSCULAR; INTRAVENOUS AS NEEDED
Status: DISCONTINUED | OUTPATIENT
Start: 2023-11-16 | End: 2023-11-16

## 2023-11-16 RX ORDER — LIDOCAINE HYDROCHLORIDE 10 MG/ML
INJECTION INFILTRATION; PERINEURAL AS NEEDED
Status: DISCONTINUED | OUTPATIENT
Start: 2023-11-16 | End: 2023-11-16 | Stop reason: HOSPADM

## 2023-11-16 RX ORDER — LIDOCAINE HCL/PF 100 MG/5ML
SYRINGE (ML) INTRAVENOUS AS NEEDED
Status: DISCONTINUED | OUTPATIENT
Start: 2023-11-16 | End: 2023-11-16

## 2023-11-16 RX ORDER — ONDANSETRON HYDROCHLORIDE 2 MG/ML
4 INJECTION, SOLUTION INTRAVENOUS ONCE
Status: DISCONTINUED | OUTPATIENT
Start: 2023-11-16 | End: 2023-11-16 | Stop reason: HOSPADM

## 2023-11-16 RX ORDER — ONDANSETRON HYDROCHLORIDE 2 MG/ML
4 INJECTION, SOLUTION INTRAVENOUS ONCE AS NEEDED
Status: DISCONTINUED | OUTPATIENT
Start: 2023-11-16 | End: 2023-11-16 | Stop reason: HOSPADM

## 2023-11-16 RX ORDER — MORPHINE SULFATE 4 MG/ML
4 INJECTION INTRAVENOUS EVERY 5 MIN PRN
Status: DISCONTINUED | OUTPATIENT
Start: 2023-11-16 | End: 2023-11-16 | Stop reason: HOSPADM

## 2023-11-16 RX ORDER — CEFTRIAXONE 1 G/50ML
1 INJECTION, SOLUTION INTRAVENOUS ONCE
Status: COMPLETED | OUTPATIENT
Start: 2023-11-16 | End: 2023-11-16

## 2023-11-16 RX ORDER — METOCLOPRAMIDE HYDROCHLORIDE 5 MG/ML
10 INJECTION INTRAMUSCULAR; INTRAVENOUS ONCE
Status: DISCONTINUED | OUTPATIENT
Start: 2023-11-16 | End: 2023-11-16 | Stop reason: HOSPADM

## 2023-11-16 RX ORDER — FAMOTIDINE 10 MG/ML
20 INJECTION INTRAVENOUS ONCE
Status: DISCONTINUED | OUTPATIENT
Start: 2023-11-16 | End: 2023-11-16 | Stop reason: HOSPADM

## 2023-11-16 RX ADMIN — MIDAZOLAM HYDROCHLORIDE 2 MG: 1 INJECTION, SOLUTION INTRAMUSCULAR; INTRAVENOUS at 12:02

## 2023-11-16 RX ADMIN — LIDOCAINE HYDROCHLORIDE 40 MG: 20 INJECTION INTRAVENOUS at 12:02

## 2023-11-16 RX ADMIN — PROPOFOL 200 MG: 10 INJECTION, EMULSION INTRAVENOUS at 12:02

## 2023-11-16 RX ADMIN — FENTANYL CITRATE 100 MCG: 50 INJECTION, SOLUTION INTRAMUSCULAR; INTRAVENOUS at 12:02

## 2023-11-16 RX ADMIN — CEFTRIAXONE SODIUM 1 G: 1 INJECTION, SOLUTION INTRAVENOUS at 11:53

## 2023-11-16 RX ADMIN — SODIUM CHLORIDE, SODIUM LACTATE, POTASSIUM CHLORIDE, AND CALCIUM CHLORIDE: 600; 310; 30; 20 INJECTION, SOLUTION INTRAVENOUS at 11:53

## 2023-11-16 SDOH — HEALTH STABILITY: MENTAL HEALTH: CURRENT SMOKER: 0

## 2023-11-16 ASSESSMENT — PAIN - FUNCTIONAL ASSESSMENT
PAIN_FUNCTIONAL_ASSESSMENT: 0-10

## 2023-11-16 ASSESSMENT — PAIN SCALES - GENERAL
PAINLEVEL_OUTOF10: 0 - NO PAIN
PAIN_LEVEL: 0
PAINLEVEL_OUTOF10: 0 - NO PAIN

## 2023-11-16 ASSESSMENT — COLUMBIA-SUICIDE SEVERITY RATING SCALE - C-SSRS
1. IN THE PAST MONTH, HAVE YOU WISHED YOU WERE DEAD OR WISHED YOU COULD GO TO SLEEP AND NOT WAKE UP?: NO
2. HAVE YOU ACTUALLY HAD ANY THOUGHTS OF KILLING YOURSELF?: NO
6. HAVE YOU EVER DONE ANYTHING, STARTED TO DO ANYTHING, OR PREPARED TO DO ANYTHING TO END YOUR LIFE?: NO

## 2023-11-16 NOTE — ANESTHESIA POSTPROCEDURE EVALUATION
Patient: Thiago Paz    Procedure Summary       Date: 11/16/23 Room / Location: [facility] / [facility]    Anesthesia Start: 1154 Anesthesia Stop: 1228    Procedure: Spaceoar and fiducials (MUST STAY AT 11:00 AM) Diagnosis:       Cancer of prostate with intermediate recurrence risk, stage T2B-C or [place] 7 or prostate-specific antigen (PSA) 10-20 (CMS/HCC)      (Cancer of prostate with intermediate recurrence risk, stage T2B-C or [place] 7 or prostate-specific antigen (PSA) 10-20 (CMS/HCC) [C61])    Surgeons: Yunier Leo MD Responsible Provider: NEVILLE Ribeiro    Anesthesia Type: MAC ASA Status: 3            Anesthesia Type: MAC    Vitals Value Taken Time   /69 11/16/23 1242   Temp 36.1 °C (97 °F) 11/16/23 1224   Pulse 49 11/16/23 1250   Resp 14 11/16/23 1250   SpO2 98 % 11/16/23 1250   Vitals shown include unvalidated device data.    Anesthesia Post Evaluation    Patient location during evaluation: PACU  Patient participation: complete - patient participated  Level of consciousness: awake and alert  Pain score: 0  Pain management: adequate  Airway patency: patent  Cardiovascular status: acceptable  Respiratory status: acceptable  Hydration status: acceptable  Postoperative Nausea and Vomiting: none        No notable events documented.

## 2023-11-16 NOTE — DISCHARGE INSTRUCTIONS
SpaceOAR Discharge Instructions:    *Please call the office with any questions.   *Hold Eliquis for 24 hours after procedure.   *Please call or present to ED for fever >101 F, shaking chills, intractable nausea and vomiting, or uncontrolled pain.  *OK to shower/bathe today   *Follow up with radiation oncology as scheduled for CT simulation for radiation planning.

## 2023-11-16 NOTE — ANESTHESIA PREPROCEDURE EVALUATION
Patient: Thiago Paz    Procedure Information       Date/Time: 11/16/23 1200    Procedure: Spaceoar and fiducials (MUST STAY AT 11:00 AM) - must be 1100 am pt lives in [place]   Spaceoar and fiducials  20 min    Location: [facility] / [facility]    Surgeons: Yunier Leo MD            Relevant Problems   Cardiovascular   (+) Abnormal EKG   (+) Atrial fibrillation (CMS/HCC)   (+) Hyperlipidemia      GI   (+) Gastroesophageal reflux disease      /Renal  Prostate cancer      Neuro/Psych   (+) Anxiety      Eyes, Ears, Nose, and Throat   (+) Vision loss       Clinical information reviewed:   Tobacco  Allergies  Meds  Problems  Med Hx  Surg Hx   Fam Hx  Soc   Hx        NPO Detail:  NPO/Void Status  Date of Last Liquid: 11/16/23  Date of Last Solid: 11/15/23         Physical Exam    Airway  Mallampati: II  TM distance: >3 FB  Neck ROM: full     Cardiovascular   Rhythm: irregular     Dental    Pulmonary - normal exam     Abdominal            Anesthesia Plan    ASA 3     MAC   (Mac with ga back up)  The patient is not a current smoker.    intravenous induction   Anesthetic plan and risks discussed with patient.  Use of blood products discussed with patient who consented to blood products.    Plan discussed with CRNA.

## 2023-11-16 NOTE — OP NOTE
Spaceoar and fiducials (MUST STAY AT 11:00 AM) Operative Note     Date: 2023  OR Location: [facility]    Name: Thiago Paz, : 1955, Age: 68 y.o., MRN: [id number], Sex: male    Diagnosis  Pre-op Diagnosis     * Cancer of prostate with intermediate recurrence risk, stage T2B-C or [place] 7 or prostate-specific antigen (PSA) 10-20 (CMS/HCC) [C61] Post-op Diagnosis     * Cancer of prostate with intermediate recurrence risk, stage T2B-C or [place] 7 or prostate-specific antigen (PSA) 10-20 (CMS/HCC) [C61]     Procedures  Spaceoar and fiducials (MUST STAY AT 11:00 AM)  44965 - IA PLMT INTERSTITIAL DEV RADIAT TX PROSTATE 1/MULT    IA TRANSPERINEAL PLMT BIODEGRADABLE MATRL 1/MLT NJX [41624]  Surgeons      * Yunier Leo - Primary    Resident/Fellow/Other Assistant:  Surgeon(s) and Role:    Procedure Summary  Anesthesia: Monitor Anesthesia Care  ASA: III  Anesthesia Staff: CRNA: RODOLFO Ribeiro-CRNA  Estimated Blood Loss: 0 mL  Intra-op Medications:   Medication Name Total Dose   lidocaine (Xylocaine) 10 mg/mL (1 %) injection 6 mL              Anesthesia Record               Intraprocedure I/O Totals          Intake    .00 mL    Propofol Drip 0.00 mL    The total shown is the total volume documented since Anesthesia Start was filed.    Total Intake 700 mL          Specimen: No specimens collected     Staff:   Circulator: Lindsey Briggs RN  Scrub Person: Irwin Mendoza RN; Malia Day RN         Drains and/or Catheters: * None in log *          Implants: Fiducial x3, SpaceOAR TERELL      Indications: Thiago Paz is an 68 y.o. male who is having surgery for Cancer of prostate with intermediate recurrence risk, stage T2B-C or [place] 7 or prostate-specific antigen (PSA) 10-20 (CMS/HCC) [C61].     The patient was seen in the preoperative area. The risks, benefits, complications, treatment options, non-operative alternatives, expected recovery and outcomes were discussed with the patient. The  possibilities of reaction to medication, pulmonary aspiration, injury to surrounding structures, bleeding, recurrent infection, the need for additional procedures, failure to diagnose a condition, and creating a complication requiring transfusion or operation were discussed with the patient. The patient concurred with the proposed plan, giving informed consent.  The site of surgery was properly noted/marked if necessary per policy. The patient has been actively warmed in preoperative area. Preoperative antibiotics have been ordered and given within 1 hours of incision. Venous thrombosis prophylaxis have been ordered including bilateral sequential compression devices    Procedure Details: Patient was met in the preoperative holding area where informed consent was obtained.  EPC cuffs on and working.  Received 1 g Rocephin IV piggyback for antibiotic prophylaxis.  Monitor anesthesia care given.  Placed in high dorsolithotomy.  All pressure points padded.  Timeout performed and all parties in agreement.   Digital rectal exam: 30 g, symmetric  Transrectal ultrasound: No overt hypoechoic nodule, normal tissue planes  Seminal vesicles: Nondilated  Hypoechoic nodule?:  No  MRI volume: 33 g   Other findings: None  Under ultrasound guidance, Civ[employer] fiducial markers placed at right posterior lateral base, right anterior apex, left mid gland.  Local anesthetic injected superficially 1% lidocaine plain.  SpaceOAR Deya kit assembled and needle injected into the plane between posterior Denonvilliers and the rectal fat plane.  Hydrodissection performed with excellent tissue separation.  Needle position was checked in both the sagittal and axial planes and there was no catching or lifting of the rectum and the needle appeared to be clearly outside of the prostatic capsule.  Needle was positioned at the mid gland to base and 10 mL SpaceOAR Deya kit injected slowly under live ultrasound for 15 seconds with excellent tissue separation.   Slightly more distribution towards the left side of the prostate.  Appeared to have good coverage apically to the base with at least 1 cm of anterior lift.  Needle was removed after 15 seconds.  No brisk bleeding.  Patient taken to recovery in stable condition.    Complications:  None; patient tolerated the procedure well.    Disposition: PACU - hemodynamically stable.  Condition: stable         Additional Details: Follow-up for CT simulation as scheduled      Yunier Leo  Phone Number: [phone]

## 2023-11-17 ENCOUNTER — TELEPHONE (OUTPATIENT)
Dept: POSTOP/PACU | Facility: HOSPITAL | Age: 68
End: 2023-11-17

## 2023-11-17 ASSESSMENT — PAIN SCALES - GENERAL: PAINLEVEL_OUTOF10: 0 - NO PAIN

## 2023-11-20 ENCOUNTER — HOSPITAL ENCOUNTER (OUTPATIENT)
Dept: RADIOLOGY | Facility: EXTERNAL LOCATION | Age: 68
Discharge: HOME | End: 2023-11-20

## 2023-11-20 DIAGNOSIS — C61 MALIGNANT NEOPLASM OF PROSTATE (MULTI): ICD-10-CM

## 2023-11-21 ENCOUNTER — HOSPITAL ENCOUNTER (OUTPATIENT)
Dept: RADIATION ONCOLOGY | Facility: HOSPITAL | Age: 68
Setting detail: RADIATION/ONCOLOGY SERIES
Discharge: HOME | End: 2023-11-21
Payer: MEDICARE

## 2023-11-21 ENCOUNTER — RADIATION ONCOLOGY OTV (OUTPATIENT)
Dept: RADIATION ONCOLOGY | Facility: CLINIC | Age: 68
End: 2023-11-21

## 2023-11-21 DIAGNOSIS — C61 MALIGNANT NEOPLASM OF PROSTATE (MULTI): Primary | ICD-10-CM

## 2023-11-21 PROCEDURE — 77334 RADIATION TREATMENT AID(S): CPT | Mod: PO | Performed by: RADIOLOGY

## 2023-11-21 PROCEDURE — 77334 RADIATION TREATMENT AID(S): CPT | Performed by: RADIOLOGY

## 2023-11-21 PROCEDURE — 77290 THER RAD SIMULAJ FIELD CPLX: CPT | Performed by: RADIOLOGY

## 2023-11-21 PROCEDURE — 77290 THER RAD SIMULAJ FIELD CPLX: CPT | Mod: PO | Performed by: RADIOLOGY

## 2023-11-30 ENCOUNTER — HOSPITAL ENCOUNTER (OUTPATIENT)
Dept: RADIATION ONCOLOGY | Facility: HOSPITAL | Age: 68
Setting detail: RADIATION/ONCOLOGY SERIES
Discharge: HOME | End: 2023-11-30
Payer: MEDICARE

## 2023-11-30 PROCEDURE — 77295 3-D RADIOTHERAPY PLAN: CPT | Mod: PO | Performed by: STUDENT IN AN ORGANIZED HEALTH CARE EDUCATION/TRAINING PROGRAM

## 2023-11-30 PROCEDURE — 77334 RADIATION TREATMENT AID(S): CPT | Performed by: STUDENT IN AN ORGANIZED HEALTH CARE EDUCATION/TRAINING PROGRAM

## 2023-11-30 PROCEDURE — 77295 3-D RADIOTHERAPY PLAN: CPT | Performed by: STUDENT IN AN ORGANIZED HEALTH CARE EDUCATION/TRAINING PROGRAM

## 2023-11-30 PROCEDURE — 77300 RADIATION THERAPY DOSE PLAN: CPT | Performed by: STUDENT IN AN ORGANIZED HEALTH CARE EDUCATION/TRAINING PROGRAM

## 2023-11-30 PROCEDURE — 77300 RADIATION THERAPY DOSE PLAN: CPT | Mod: PO | Performed by: STUDENT IN AN ORGANIZED HEALTH CARE EDUCATION/TRAINING PROGRAM

## 2023-11-30 PROCEDURE — 77334 RADIATION TREATMENT AID(S): CPT | Mod: PO | Performed by: STUDENT IN AN ORGANIZED HEALTH CARE EDUCATION/TRAINING PROGRAM

## 2023-12-05 ENCOUNTER — HOSPITAL ENCOUNTER (OUTPATIENT)
Dept: RADIATION ONCOLOGY | Facility: HOSPITAL | Age: 68
Setting detail: RADIATION/ONCOLOGY SERIES
Discharge: HOME | End: 2023-12-05
Payer: MEDICARE

## 2023-12-05 DIAGNOSIS — C61 MALIGNANT NEOPLASM OF PROSTATE (MULTI): ICD-10-CM

## 2023-12-05 DIAGNOSIS — Z51.0 ENCOUNTER FOR ANTINEOPLASTIC RADIATION THERAPY: ICD-10-CM

## 2023-12-05 LAB
RAD ONC MSQ ACTUAL FRACTIONS DELIVERED: 1
RAD ONC MSQ ACTUAL SESSION DELIVERED DOSE: 800 CGRAY
RAD ONC MSQ ACTUAL TOTAL DOSE: 800 CGRAY
RAD ONC MSQ ELAPSED DAYS: 0
RAD ONC MSQ LAST DATE: NORMAL
RAD ONC MSQ PRESCRIBED FRACTIONAL DOSE: 800 CGRAY
RAD ONC MSQ PRESCRIBED NUMBER OF FRACTIONS: 5
RAD ONC MSQ PRESCRIBED TECHNIQUE: NORMAL
RAD ONC MSQ PRESCRIBED TOTAL DOSE: 4000 CGRAY
RAD ONC MSQ PRESCRIPTION PATTERN COMMENT: NORMAL
RAD ONC MSQ START DATE: NORMAL
RAD ONC MSQ TREATMENT COURSE NUMBER: 1
RAD ONC MSQ TREATMENT SITE: NORMAL

## 2023-12-05 PROCEDURE — 77280 THER RAD SIMULAJ FIELD SMPL: CPT | Performed by: RADIOLOGY

## 2023-12-05 PROCEDURE — 77373 STRTCTC BDY RAD THER TX DLVR: CPT | Mod: PO | Performed by: RADIOLOGY

## 2023-12-05 PROCEDURE — 77280 THER RAD SIMULAJ FIELD SMPL: CPT | Mod: PO | Performed by: RADIOLOGY

## 2023-12-07 ENCOUNTER — HOSPITAL ENCOUNTER (OUTPATIENT)
Dept: RADIATION ONCOLOGY | Facility: HOSPITAL | Age: 68
Setting detail: RADIATION/ONCOLOGY SERIES
Discharge: HOME | End: 2023-12-07
Payer: MEDICARE

## 2023-12-07 DIAGNOSIS — C61 MALIGNANT NEOPLASM OF PROSTATE (MULTI): ICD-10-CM

## 2023-12-07 DIAGNOSIS — Z51.0 ENCOUNTER FOR ANTINEOPLASTIC RADIATION THERAPY: ICD-10-CM

## 2023-12-07 LAB
RAD ONC MSQ ACTUAL FRACTIONS DELIVERED: 2
RAD ONC MSQ ACTUAL SESSION DELIVERED DOSE: 800 CGRAY
RAD ONC MSQ ACTUAL TOTAL DOSE: 1600 CGRAY
RAD ONC MSQ ELAPSED DAYS: 2
RAD ONC MSQ LAST DATE: NORMAL
RAD ONC MSQ PRESCRIBED FRACTIONAL DOSE: 800 CGRAY
RAD ONC MSQ PRESCRIBED NUMBER OF FRACTIONS: 5
RAD ONC MSQ PRESCRIBED TECHNIQUE: NORMAL
RAD ONC MSQ PRESCRIBED TOTAL DOSE: 4000 CGRAY
RAD ONC MSQ PRESCRIPTION PATTERN COMMENT: NORMAL
RAD ONC MSQ START DATE: NORMAL
RAD ONC MSQ TREATMENT COURSE NUMBER: 1
RAD ONC MSQ TREATMENT SITE: NORMAL

## 2023-12-07 PROCEDURE — 77373 STRTCTC BDY RAD THER TX DLVR: CPT | Mod: PO | Performed by: RADIOLOGY

## 2023-12-11 ENCOUNTER — HOSPITAL ENCOUNTER (OUTPATIENT)
Dept: RADIATION ONCOLOGY | Facility: HOSPITAL | Age: 68
Setting detail: RADIATION/ONCOLOGY SERIES
Discharge: HOME | End: 2023-12-11
Payer: MEDICARE

## 2023-12-11 DIAGNOSIS — C61 MALIGNANT NEOPLASM OF PROSTATE (MULTI): ICD-10-CM

## 2023-12-11 DIAGNOSIS — Z51.0 ENCOUNTER FOR ANTINEOPLASTIC RADIATION THERAPY: ICD-10-CM

## 2023-12-11 LAB
RAD ONC MSQ ACTUAL FRACTIONS DELIVERED: 3
RAD ONC MSQ ACTUAL SESSION DELIVERED DOSE: 800 CGRAY
RAD ONC MSQ ACTUAL TOTAL DOSE: 2400 CGRAY
RAD ONC MSQ ELAPSED DAYS: 6
RAD ONC MSQ LAST DATE: NORMAL
RAD ONC MSQ PRESCRIBED FRACTIONAL DOSE: 800 CGRAY
RAD ONC MSQ PRESCRIBED NUMBER OF FRACTIONS: 5
RAD ONC MSQ PRESCRIBED TECHNIQUE: NORMAL
RAD ONC MSQ PRESCRIBED TOTAL DOSE: 4000 CGRAY
RAD ONC MSQ PRESCRIPTION PATTERN COMMENT: NORMAL
RAD ONC MSQ START DATE: NORMAL
RAD ONC MSQ TREATMENT COURSE NUMBER: 1
RAD ONC MSQ TREATMENT SITE: NORMAL

## 2023-12-11 PROCEDURE — 77373 STRTCTC BDY RAD THER TX DLVR: CPT | Mod: PO | Performed by: RADIOLOGY

## 2023-12-13 ENCOUNTER — HOSPITAL ENCOUNTER (OUTPATIENT)
Dept: RADIATION ONCOLOGY | Facility: HOSPITAL | Age: 68
Setting detail: RADIATION/ONCOLOGY SERIES
Discharge: HOME | End: 2023-12-13
Payer: MEDICARE

## 2023-12-13 DIAGNOSIS — C61 MALIGNANT NEOPLASM OF PROSTATE (MULTI): ICD-10-CM

## 2023-12-13 DIAGNOSIS — Z51.0 ENCOUNTER FOR ANTINEOPLASTIC RADIATION THERAPY: ICD-10-CM

## 2023-12-13 LAB
RAD ONC MSQ ACTUAL FRACTIONS DELIVERED: 4
RAD ONC MSQ ACTUAL SESSION DELIVERED DOSE: 800 CGRAY
RAD ONC MSQ ACTUAL TOTAL DOSE: 3200 CGRAY
RAD ONC MSQ ELAPSED DAYS: 8
RAD ONC MSQ LAST DATE: NORMAL
RAD ONC MSQ PRESCRIBED FRACTIONAL DOSE: 800 CGRAY
RAD ONC MSQ PRESCRIBED NUMBER OF FRACTIONS: 5
RAD ONC MSQ PRESCRIBED TECHNIQUE: NORMAL
RAD ONC MSQ PRESCRIBED TOTAL DOSE: 4000 CGRAY
RAD ONC MSQ PRESCRIPTION PATTERN COMMENT: NORMAL
RAD ONC MSQ START DATE: NORMAL
RAD ONC MSQ TREATMENT COURSE NUMBER: 1
RAD ONC MSQ TREATMENT SITE: NORMAL

## 2023-12-13 PROCEDURE — 77373 STRTCTC BDY RAD THER TX DLVR: CPT | Mod: PO | Performed by: STUDENT IN AN ORGANIZED HEALTH CARE EDUCATION/TRAINING PROGRAM

## 2023-12-13 PROCEDURE — 77336 RADIATION PHYSICS CONSULT: CPT | Mod: PO | Performed by: STUDENT IN AN ORGANIZED HEALTH CARE EDUCATION/TRAINING PROGRAM

## 2023-12-15 ENCOUNTER — HOSPITAL ENCOUNTER (OUTPATIENT)
Dept: RADIATION ONCOLOGY | Facility: HOSPITAL | Age: 68
Setting detail: RADIATION/ONCOLOGY SERIES
Discharge: HOME | End: 2023-12-15
Payer: MEDICARE

## 2023-12-15 ENCOUNTER — RADIATION ONCOLOGY OTV (OUTPATIENT)
Dept: RADIATION ONCOLOGY | Facility: HOSPITAL | Age: 68
End: 2023-12-15
Payer: MEDICARE

## 2023-12-15 ENCOUNTER — DOCUMENTATION (OUTPATIENT)
Dept: RADIATION ONCOLOGY | Facility: HOSPITAL | Age: 68
End: 2023-12-15

## 2023-12-15 VITALS
WEIGHT: 210.8 LBS | RESPIRATION RATE: 18 BRPM | DIASTOLIC BLOOD PRESSURE: 85 MMHG | TEMPERATURE: 97.5 F | HEART RATE: 71 BPM | SYSTOLIC BLOOD PRESSURE: 152 MMHG | BODY MASS INDEX: 29.4 KG/M2

## 2023-12-15 DIAGNOSIS — Z51.0 ENCOUNTER FOR ANTINEOPLASTIC RADIATION THERAPY: ICD-10-CM

## 2023-12-15 DIAGNOSIS — C61 MALIGNANT NEOPLASM OF PROSTATE (MULTI): Primary | ICD-10-CM

## 2023-12-15 DIAGNOSIS — C61 MALIGNANT NEOPLASM OF PROSTATE (MULTI): ICD-10-CM

## 2023-12-15 LAB
RAD ONC MSQ ACTUAL FRACTIONS DELIVERED: 5
RAD ONC MSQ ACTUAL SESSION DELIVERED DOSE: 800 CGRAY
RAD ONC MSQ ACTUAL TOTAL DOSE: 4000 CGRAY
RAD ONC MSQ ELAPSED DAYS: 10
RAD ONC MSQ LAST DATE: NORMAL
RAD ONC MSQ PRESCRIBED FRACTIONAL DOSE: 800 CGRAY
RAD ONC MSQ PRESCRIBED NUMBER OF FRACTIONS: 5
RAD ONC MSQ PRESCRIBED TECHNIQUE: NORMAL
RAD ONC MSQ PRESCRIBED TOTAL DOSE: 4000 CGRAY
RAD ONC MSQ PRESCRIPTION PATTERN COMMENT: NORMAL
RAD ONC MSQ START DATE: NORMAL
RAD ONC MSQ TREATMENT COURSE NUMBER: 1
RAD ONC MSQ TREATMENT SITE: NORMAL

## 2023-12-15 PROCEDURE — 77435 SBRT MANAGEMENT: CPT | Performed by: STUDENT IN AN ORGANIZED HEALTH CARE EDUCATION/TRAINING PROGRAM

## 2023-12-15 PROCEDURE — 77373 STRTCTC BDY RAD THER TX DLVR: CPT | Mod: PO | Performed by: RADIOLOGY

## 2023-12-15 RX ORDER — TAMSULOSIN HYDROCHLORIDE 0.4 MG/1
0.4 CAPSULE ORAL DAILY
Qty: 30 CAPSULE | Refills: 1 | Status: SHIPPED | OUTPATIENT
Start: 2023-12-15 | End: 2024-02-13

## 2023-12-15 ASSESSMENT — PAIN SCALES - GENERAL: PAINLEVEL: 0-NO PAIN

## 2023-12-15 NOTE — PROGRESS NOTES
RADIATION ONCOLOGY ON-TREATMENT VISIT NOTE  Patient Name:  Thiago Paz  MRN:  [id number]  :  1955    Radiation Oncologist: Lakia Tavarez MD PhD  Primary Care Provider: Aryan Fleming DO  Care Team: Patient Care Team:  Aryan Fleming DO as PCP - General (Internal Medicine)  Lakia Tavarez MD PhD as Radiation Oncologist (Radiation Oncology)    Date of Service: 12/15/2023     Thiago Paz is a 68 y.o.-year-old with:  Malignant neoplasm of prostate (CMS/HCC), Clinical: Provider-entered Stage IIB (cT1c, cN0, PSA: 4.4, Grade Group: 2)  Specialty Problems          Radiation Oncology Problems    Malignant neoplasm of prostate (CMS/HCC)         Treatment Summary:  Radiation Treatments       Active   No active radiation treatments to show.     Completed   Prost+SV (Started on 2023)   Most recent fraction: 800 cGy given on 12/15/2023   Total given: 4,000 cGy / 4,000 cGy  (5 of 5 fractions)   Elapsed Days: 10   Technique: SBRT                 Concurrent ADT: none    SUBJECTIVE: Today, patient reports doing well. Reports intermittent dysuria and increased urinary frequency. Endorses regular normal bowel movements. Denies any other symptoms at this time.     OBJECTIVE:   Vital Signs:  /85   Pulse 71   Temp 36.4 °C (97.5 °F)   Resp 18   Wt 95.6 kg (210 lb 12.8 oz)   BMI 29.40 kg/m²    Pain Scale: 0 /10.      Toxicity Assessment          12/15/2023    15:38   Toxicity Assessment   Adverse Events Reviewed (WDL) No (Exceptions to WDL)   Treatment Site Pelvis - male   Dermatitis Radiation Grade 0   Diarrhea Grade 0   Fatigue Grade 0   Nausea Grade 0   Pain Grade 0   Abdominal Pain Grade 0   Constipation Grade 0   Hematuria Grade 0   Urinary Incontinence Grade 0   Urinary Frequency Grade 0   Urinary Retention Grade 0   Urinary Tract Pain Grade 1       mild pain with urinating        ASSESSMENT/PLAN:  The patient is tolerating radiation therapy as anticipated. Treatment complete, follow-up as scheduled.    Follow up with ISIDRO Sacha Millard in 3 months with PSA. Recommend Flomax, patient declined, sent to pharmacy in case he needs it, patient agrees.  --  I personally saw and evaluated the patient with the resident and agree with documentation as per above. Continue RT.    Lakia Tavarez MD PhD  12/15/2023  , Radiation Oncology

## 2023-12-21 NOTE — PROGRESS NOTES
RADIATION COMPLETION OF THERAPY NOTE    Patient Name:  Thiago Paz  MRN:  [id number]  :  1955    Radiation Oncologist: Lakia Tavarez MD PhD  Referring Provider: No ref. provider found  Primary Care Provider: Aryan Fleming DO    Brief History: Thiago Paz is a 68 y.o. male with Malignant neoplasm of prostate (CMS/HCC), Clinical: Provider-entered Stage IIB (cT1c, cN0, PSA: 4.4, Grade Group: 2).      The patient completed radiotherapy as outlined below.    Radiation Treatment Summary:         Completed   Prost+SV (Started on 2023)   Most recent fraction: 800 cGy given on 12/15/2023   Total given: 4,000 cGy / 4,000 cGy  (5 of 5 fractions)   Elapsed Days: 10   Technique: SBRT              Concurrent ADT: none     CTCAE Toxicity Overview:   Toxicity Assessment          12/15/2023    15:38   Toxicity Assessment   Adverse Events Reviewed (WDL) No (Exceptions to WDL)   Treatment Site Pelvis - male   Dermatitis Radiation Grade 0   Diarrhea Grade 0   Fatigue Grade 0   Nausea Grade 0   Pain Grade 0   Abdominal Pain Grade 0   Constipation Grade 0   Hematuria Grade 0   Urinary Incontinence Grade 0   Urinary Frequency Grade 0   Urinary Retention Grade 0   Urinary Tract Pain Grade 1       mild pain with urinating     Patient Disposition: He will return to radiation oncology with repeat PSA:    Future Appointments   Date Time Provider Department Center   3/15/2024  2:30 PM RODOLFO [place]-CNP [id number]

## 2024-03-14 ENCOUNTER — TELEPHONE (OUTPATIENT)
Dept: RADIATION ONCOLOGY | Facility: HOSPITAL | Age: 69
End: 2024-03-14
Payer: MEDICARE

## 2024-03-14 NOTE — TELEPHONE ENCOUNTER
Called pt. to remind of appointment on 3/18/2024 at 2:30 pm with TERESA Millard.  Pt answered and confirmed appointment.

## 2024-03-15 ENCOUNTER — APPOINTMENT (OUTPATIENT)
Dept: RADIATION ONCOLOGY | Facility: HOSPITAL | Age: 69
End: 2024-03-15
Payer: MEDICARE

## 2024-03-18 ENCOUNTER — LAB (OUTPATIENT)
Dept: LAB | Facility: HOSPITAL | Age: 69
End: 2024-03-18
Payer: MEDICARE

## 2024-03-18 ENCOUNTER — HOSPITAL ENCOUNTER (OUTPATIENT)
Dept: RADIATION ONCOLOGY | Facility: HOSPITAL | Age: 69
Setting detail: RADIATION/ONCOLOGY SERIES
Discharge: HOME | End: 2024-03-18
Payer: MEDICARE

## 2024-03-18 VITALS
HEART RATE: 64 BPM | HEIGHT: 71 IN | RESPIRATION RATE: 18 BRPM | TEMPERATURE: 97.7 F | DIASTOLIC BLOOD PRESSURE: 88 MMHG | BODY MASS INDEX: 29.34 KG/M2 | WEIGHT: 209.6 LBS | OXYGEN SATURATION: 97 % | SYSTOLIC BLOOD PRESSURE: 138 MMHG

## 2024-03-18 DIAGNOSIS — C61 MALIGNANT NEOPLASM OF PROSTATE (MULTI): ICD-10-CM

## 2024-03-18 DIAGNOSIS — N52.35 ERECTILE DYSFUNCTION FOLLOWING RADIATION THERAPY: ICD-10-CM

## 2024-03-18 DIAGNOSIS — C61 CANCER OF PROSTATE WITH INTERMEDIATE RECURRENCE RISK, STAGE T2B-C OR GLEASON 7 OR PROSTATE-SPECIFIC ANTIGEN (PSA) 10-20 (MULTI): Primary | ICD-10-CM

## 2024-03-18 LAB — PSA SERPL-MCNC: 1.07 NG/ML

## 2024-03-18 PROCEDURE — 36415 COLL VENOUS BLD VENIPUNCTURE: CPT

## 2024-03-18 PROCEDURE — 84153 ASSAY OF PSA TOTAL: CPT | Performed by: STUDENT IN AN ORGANIZED HEALTH CARE EDUCATION/TRAINING PROGRAM

## 2024-03-18 PROCEDURE — 99214 OFFICE O/P EST MOD 30 MIN: CPT

## 2024-03-18 ASSESSMENT — ENCOUNTER SYMPTOMS
DEPRESSION: 0
CONSTIPATION: 0
UNEXPECTED WEIGHT CHANGE: 0
BACK PAIN: 0
ARTHRALGIAS: 0
ABDOMINAL PAIN: 0
PALPITATIONS: 0
BLOOD IN STOOL: 0
RECTAL PAIN: 0
ANAL BLEEDING: 0
ALLERGIC/IMMUNOLOGIC NEGATIVE: 1
COUGH: 0
JOINT SWELLING: 0
LOSS OF SENSATION IN FEET: 0
FREQUENCY: 0
FATIGUE: 0
PSYCHIATRIC NEGATIVE: 1
SHORTNESS OF BREATH: 0
DYSURIA: 0
DIARRHEA: 0
DIZZINESS: 0
OCCASIONAL FEELINGS OF UNSTEADINESS: 0
FEVER: 0
WEAKNESS: 0
HEMATURIA: 0
DIFFICULTY URINATING: 0
CHEST TIGHTNESS: 0

## 2024-03-18 ASSESSMENT — COLUMBIA-SUICIDE SEVERITY RATING SCALE - C-SSRS
1. IN THE PAST MONTH, HAVE YOU WISHED YOU WERE DEAD OR WISHED YOU COULD GO TO SLEEP AND NOT WAKE UP?: NO
6. HAVE YOU EVER DONE ANYTHING, STARTED TO DO ANYTHING, OR PREPARED TO DO ANYTHING TO END YOUR LIFE?: NO
2. HAVE YOU ACTUALLY HAD ANY THOUGHTS OF KILLING YOURSELF?: NO

## 2024-03-18 ASSESSMENT — PATIENT HEALTH QUESTIONNAIRE - PHQ9
2. FEELING DOWN, DEPRESSED OR HOPELESS: NOT AT ALL
1. LITTLE INTEREST OR PLEASURE IN DOING THINGS: NOT AT ALL
SUM OF ALL RESPONSES TO PHQ9 QUESTIONS 1 AND 2: 0

## 2024-03-18 ASSESSMENT — PAIN SCALES - GENERAL: PAINLEVEL: 0-NO PAIN

## 2024-03-18 NOTE — PROGRESS NOTES
"Cancer synopsis:  Rad/onc: Dr. Tavarez/ [place] [facility]     68-year-old with newly diagnosed prostate adenocarcinoma,  iPSA 4.37, GG2 (6/15 cores+, 0/1 targeted core+, no evidence of cribriform).     3/29/2023 PSA 4.37     6/28/2023 MRI prostate noted a ~33cc prostate, with PI-RADS 4 lesion noted in the right posterolateral PZ, without evidence of gross MANDI, SVI, or abnormal lymphadenopathy     7/12/2023 MR targeted biopsy by Dr. Ni  noted GG2 (6/15 cores+, 0/1 targeted core+, no evidence of cribriform).     History of presenting illness:    Patient ID: [id number]     Thiago Paz is a 68 y.o. male who presents for his FIR prostate cancer GG2, IPSA 4.37 now s/p RT without ADT.    RT Site: prostate and SV  RT Date: 12/15/2023  Hormone therapy: No  Hot Flushes: Denies  Fatigue: Denies  Bone pain: Denies  JASKARAN: 17  ED: Admits to some mild decrease in libido since RT as well as decreased ability to sustain erection. Has used PDE5i in the past that seemed to help however did not enjoy SE. States had facial flushing and some minor palpations.  ED medications: No  IPSS: 5  Urinary symptoms: Denies dysuria, hematuria, frequency, urgency, urine leakage oe weak stream. States \"stream feels better after RT\".  Urinary Medications: No  Rectal bleeding: Denies  Colonoscopy: none on file  Other systems: Denies SOB, CP, fever      Review of systems:  Review of Systems   Constitutional:  Negative for fatigue, fever and unexpected weight change.   Respiratory:  Negative for cough, chest tightness and shortness of breath.    Cardiovascular:  Negative for chest pain, palpitations and leg swelling.   Gastrointestinal:  Negative for abdominal pain, anal bleeding, blood in stool, constipation, diarrhea and rectal pain.   Endocrine: Negative for cold intolerance, heat intolerance and polyuria.   Genitourinary:  Negative for decreased urine volume, difficulty urinating, dysuria, frequency, hematuria and urgency.        Refer to HPI " "  Musculoskeletal:  Negative for arthralgias, back pain, gait problem and joint swelling.   Skin: Negative.    Allergic/Immunologic: Negative.    Neurological:  Negative for dizziness, syncope and weakness.   Psychiatric/Behavioral: Negative.         Past Medical history  Past Medical History:   Diagnosis Date    A-fib (CMS/HCC)     Arrhythmia     Depression     GERD (gastroesophageal reflux disease)     Hypercholesteremia     Macular degeneration     Prostate cancer (CMS/HCC)         Surgical/family history  Family History   Problem Relation Name Age of Onset    Breast cancer Mother      Prostate cancer Father      Cancer Sister          doesn't know the type of cancer      Past Surgical History:   Procedure Laterality Date    HERNIA REPAIR      KIDNEY STONE SURGERY      MASS EXCISION      OTHER SURGICAL HISTORY      removal of Thymus gland    UPPER GASTROINTESTINAL ENDOSCOPY          Social History  Tobacco Use: Low Risk  (3/18/2024)    Patient History     Smoking Tobacco Use: Never     Smokeless Tobacco Use: Never     Passive Exposure: Not on file         Current med list:  Current Outpatient Medications   Medication Instructions    acetaminophen (TYLENOL 8 HOUR) 650 mg, oral, Every 8 hours PRN, Do not crush, chew, or split.    apixaban (ELIQUIS) 2.5 mg, oral, 2 times daily    atorvastatin (LIPITOR) 40 mg, oral, Daily    DULoxetine (CYMBALTA) 30 mg, oral, Every other day, Do not crush or chew.    DULoxetine (CYMBALTA) 60 mg, oral, Every other day, Do not crush or chew.    metoprolol tartrate (LOPRESSOR) 25 mg, oral, 2 times daily    omega 3-dha-epa-fish oil (Fish OiL) 1,000 mg (120 mg-180 mg) capsule oral    pantoprazole (PROTONIX) 20 mg, oral, Daily before breakfast, Do not crush, chew, or split.    zolpidem (AMBIEN) 5 mg, oral, Nightly PRN        Last recorded vital:  /88   Pulse 64   Temp 36.5 °C (97.7 °F) (Temporal)   Resp 18   Ht 1.803 m (5' 11\")   Wt 95.1 kg (209 lb 9.6 oz)   SpO2 97%   BMI " "29.23 kg/m²     Physical exam  Physical Exam  Constitutional:       Appearance: Normal appearance.   Cardiovascular:      Rate and Rhythm: Normal rate.   Pulmonary:      Effort: Pulmonary effort is normal.      Breath sounds: Normal breath sounds.   Musculoskeletal:         General: Normal range of motion.      Cervical back: Normal range of motion.   Neurological:      Mental Status: He is alert and oriented to person, place, and time.   Psychiatric:         Mood and Affect: Mood normal.         Behavior: Behavior normal.         Thought Content: Thought content normal.         Judgment: Judgment normal.         Pertinent labs:  No results found for: \"PR1\", \"PSA\", \"TESTOST\", \"CMPLASABS\"      Dx:  Problem List Items Addressed This Visit       Malignant neoplasm of prostate (CMS/HCC)    Relevant Orders    Clinic Appointment Request Follow Up; SACHA SCOTT; [facility] [address] (Completed)    Cancer of prostate with intermediate recurrence risk, stage T2B-C or [place] 7 or prostate-specific antigen (PSA) 10-20 (CMS/HCC) - Primary     Other Visit Diagnoses       Erectile dysfunction following radiation therapy            Due for labs, will call with lab results. Review of latent SE including rectal bleeding, hematuria, urinary strictures, ED where reviewed as well as how to contact office if s/s present. Admits to mild ED. Denies other latent SE. NCCN guidelines where reviewed and routine FUV of every 3m for first year and every 6m for four years for a total of five years was discussed. Patient verbalized understanding.       PLAN:  FUV 4m   Labs PSA today and in 4m  Imaging none  FUV other providers: PCP for routine evals      Please contact office with any concerns:  Sacha Yousifient CNP  [phone]    " none

## 2024-07-17 ENCOUNTER — TELEPHONE (OUTPATIENT)
Dept: RADIATION ONCOLOGY | Facility: HOSPITAL | Age: 69
End: 2024-07-17
Payer: MEDICARE

## 2024-07-17 ASSESSMENT — ENCOUNTER SYMPTOMS
DIZZINESS: 0
FATIGUE: 0
FREQUENCY: 0
DYSURIA: 0
FEVER: 0
BACK PAIN: 0
WEAKNESS: 0
BLOOD IN STOOL: 0
ANAL BLEEDING: 0
PSYCHIATRIC NEGATIVE: 1
JOINT SWELLING: 0
DIARRHEA: 0
ALLERGIC/IMMUNOLOGIC NEGATIVE: 1
ABDOMINAL PAIN: 0
PALPITATIONS: 0
DIFFICULTY URINATING: 0
UNEXPECTED WEIGHT CHANGE: 0
RECTAL PAIN: 0
CHEST TIGHTNESS: 0
COUGH: 0
ARTHRALGIAS: 0
HEMATURIA: 0
CONSTIPATION: 0
SHORTNESS OF BREATH: 0

## 2024-07-17 NOTE — TELEPHONE ENCOUNTER
Called pt. to remind of appointment on 7/18/2024 at 2:00 pm with TERESA Millard. Pt's phone went to voicemail left number if needs to reschedule.

## 2024-07-18 ENCOUNTER — APPOINTMENT (OUTPATIENT)
Dept: RADIATION ONCOLOGY | Facility: HOSPITAL | Age: 69
End: 2024-07-18
Payer: MEDICARE

## 2024-07-18 DIAGNOSIS — C61 MALIGNANT NEOPLASM OF PROSTATE (MULTI): Primary | ICD-10-CM

## 2024-07-19 ENCOUNTER — TELEPHONE (OUTPATIENT)
Dept: RADIATION ONCOLOGY | Facility: HOSPITAL | Age: 69
End: 2024-07-19
Payer: MEDICARE

## 2024-07-22 ENCOUNTER — HOSPITAL ENCOUNTER (OUTPATIENT)
Dept: RADIATION ONCOLOGY | Facility: HOSPITAL | Age: 69
Setting detail: RADIATION/ONCOLOGY SERIES
Discharge: HOME | End: 2024-07-22
Payer: MEDICARE

## 2024-07-22 ENCOUNTER — LAB (OUTPATIENT)
Dept: LAB | Facility: HOSPITAL | Age: 69
End: 2024-07-22
Payer: MEDICARE

## 2024-07-22 VITALS
TEMPERATURE: 97.7 F | SYSTOLIC BLOOD PRESSURE: 136 MMHG | HEART RATE: 71 BPM | OXYGEN SATURATION: 95 % | BODY MASS INDEX: 29.97 KG/M2 | RESPIRATION RATE: 18 BRPM | WEIGHT: 214.9 LBS | DIASTOLIC BLOOD PRESSURE: 81 MMHG

## 2024-07-22 DIAGNOSIS — N52.35 ERECTILE DYSFUNCTION FOLLOWING RADIATION THERAPY: ICD-10-CM

## 2024-07-22 DIAGNOSIS — C61 CANCER OF PROSTATE WITH INTERMEDIATE RECURRENCE RISK, STAGE T2B-C OR GLEASON 7 OR PROSTATE-SPECIFIC ANTIGEN (PSA) 10-20 (MULTI): ICD-10-CM

## 2024-07-22 DIAGNOSIS — C61 MALIGNANT NEOPLASM OF PROSTATE (MULTI): Primary | ICD-10-CM

## 2024-07-22 LAB — PSA SERPL-MCNC: 0.88 NG/ML

## 2024-07-22 PROCEDURE — 99214 OFFICE O/P EST MOD 30 MIN: CPT

## 2024-07-22 PROCEDURE — 84153 ASSAY OF PSA TOTAL: CPT

## 2024-07-22 PROCEDURE — 36415 COLL VENOUS BLD VENIPUNCTURE: CPT

## 2024-07-22 RX ORDER — TADALAFIL 10 MG/1
10 TABLET ORAL DAILY PRN
Qty: 5 TABLET | Refills: 1 | Status: SHIPPED | OUTPATIENT
Start: 2024-07-22 | End: 2024-08-21

## 2024-07-22 ASSESSMENT — ENCOUNTER SYMPTOMS
SHORTNESS OF BREATH: 0
DEPRESSION: 0
DIZZINESS: 0
PALPITATIONS: 0
DIFFICULTY URINATING: 0
FATIGUE: 0
OCCASIONAL FEELINGS OF UNSTEADINESS: 0
FEVER: 0
ALLERGIC/IMMUNOLOGIC NEGATIVE: 1
FREQUENCY: 0
ANAL BLEEDING: 0
HEMATURIA: 0
ABDOMINAL PAIN: 0
BACK PAIN: 0
CHEST TIGHTNESS: 0
BLOOD IN STOOL: 0
DIARRHEA: 0
JOINT SWELLING: 0
WEAKNESS: 0
CONSTIPATION: 0
ARTHRALGIAS: 0
DYSURIA: 0
PSYCHIATRIC NEGATIVE: 1
COUGH: 0
LOSS OF SENSATION IN FEET: 0
UNEXPECTED WEIGHT CHANGE: 0
RECTAL PAIN: 0

## 2024-07-22 ASSESSMENT — PAIN SCALES - GENERAL: PAINLEVEL: 2

## 2024-07-22 ASSESSMENT — PATIENT HEALTH QUESTIONNAIRE - PHQ9
1. LITTLE INTEREST OR PLEASURE IN DOING THINGS: NOT AT ALL
2. FEELING DOWN, DEPRESSED OR HOPELESS: NOT AT ALL
SUM OF ALL RESPONSES TO PHQ9 QUESTIONS 1 AND 2: 0

## 2024-07-22 ASSESSMENT — COLUMBIA-SUICIDE SEVERITY RATING SCALE - C-SSRS
6. HAVE YOU EVER DONE ANYTHING, STARTED TO DO ANYTHING, OR PREPARED TO DO ANYTHING TO END YOUR LIFE?: NO
2. HAVE YOU ACTUALLY HAD ANY THOUGHTS OF KILLING YOURSELF?: NO
1. IN THE PAST MONTH, HAVE YOU WISHED YOU WERE DEAD OR WISHED YOU COULD GO TO SLEEP AND NOT WAKE UP?: NO

## 2024-07-22 NOTE — PROGRESS NOTES
"Cancer synopsis:  Rad/onc: Dr. Tavarez/ [place] [facility]     69-year-old with newly diagnosed prostate adenocarcinoma,  iPSA 4.37, GG2 (6/15 cores+, 0/1 targeted core+, no evidence of cribriform).     3/29/2023 PSA 4.37     6/28/2023 MRI prostate noted a ~33cc prostate, with PI-RADS 4 lesion noted in the right posterolateral PZ, without evidence of gross MANDI, SVI, or abnormal lymphadenopathy     7/12/2023 MR targeted biopsy by Dr. iN  noted GG2 (6/15 cores+, 0/1 targeted core+, no evidence of cribriform).     RT 12/15/2023    Recent imaging  none    History of presenting illness:    Patient ID: [id number]     Thiago Paz is a 69 y.o. male who presents for his FIR prostate cancer GG2, IPSA 4.37 now s/p RT without ADT.    RT Site: prostate and SV  RT Date: 12/15/2023  Hormone therapy: No  Hot Flushes: Denies  Fatigue: Denies  Bone pain: Denies  ED: Admits to some mild decrease in libido since RT as well as decreased ability to sustain erection. Has used PDE5i in the past that seemed to help however did not enjoy SE. States had facial flushing and some minor palpations. Notes decreased ejaculate.   ED medications: No  IPSS: did not complete  Urinary symptoms: Denies dysuria, frequency, urgency, urine leakage of weak stream. Admits to new one time episode of bleed in urine, was recently dx w/ renal stone. States \"stream feels better after RT\".  Urinary Medications: flowmax  Rectal bleeding: Denies  Colonoscopy: none on file  Musculoskeletal: Admits to vague pain in groin area that was ongoing for several weeks occurring intermittently for short periods of a few hours. Rated typically around 2/3. Has had CT w/ PCP w/ small kidney stone noticed. Flowmax started. Stone has not passed yet per patient.   Other systems: Denies SOB, CP, fever    Review of systems:  Review of Systems   Constitutional:  Negative for fatigue, fever and unexpected weight change.   Respiratory:  Negative for cough, chest tightness and shortness of " breath.    Cardiovascular:  Negative for chest pain, palpitations and leg swelling.   Gastrointestinal:  Negative for abdominal pain, anal bleeding, blood in stool, constipation, diarrhea and rectal pain.   Endocrine: Negative for cold intolerance, heat intolerance and polyuria.   Genitourinary:  Negative for decreased urine volume, difficulty urinating, dysuria, frequency, hematuria and urgency.        Refer to HPI   Musculoskeletal:  Negative for arthralgias, back pain, gait problem and joint swelling.   Skin: Negative.    Allergic/Immunologic: Negative.    Neurological:  Negative for dizziness, syncope and weakness.   Psychiatric/Behavioral: Negative.       Past Medical history  Past Medical History:   Diagnosis Date    A-fib (Multi)     Arrhythmia     Depression     GERD (gastroesophageal reflux disease)     Hypercholesteremia     Macular degeneration     Prostate cancer (Multi)       Surgical/family history  Family History   Problem Relation Name Age of Onset    Breast cancer Mother      Prostate cancer Father      Cancer Sister          doesn't know the type of cancer      Past Surgical History:   Procedure Laterality Date    HERNIA REPAIR      KIDNEY STONE SURGERY      MASS EXCISION      OTHER SURGICAL HISTORY      removal of Thymus gland    UPPER GASTROINTESTINAL ENDOSCOPY        Social History  Tobacco Use: Low Risk  (5/31/2024)    Received from [facility]    Patient History     Smoking Tobacco Use: Never     Smokeless Tobacco Use: Never     Passive Exposure: Not on file       Current med list:  Current Outpatient Medications   Medication Instructions    acetaminophen (TYLENOL 8 HOUR) 650 mg, oral, Every 8 hours PRN, Do not crush, chew, or split.    apixaban (ELIQUIS) 2.5 mg, oral, 2 times daily    atorvastatin (LIPITOR) 40 mg, oral, Daily    DULoxetine (CYMBALTA) 30 mg, oral, Every other day, Do not crush or chew.    DULoxetine (CYMBALTA) 60 mg, oral, Every other day, Do not crush or chew.     metoprolol tartrate (LOPRESSOR) 25 mg, oral, 2 times daily    omega 3-dha-epa-fish oil (Fish OiL) 1,000 mg (120 mg-180 mg) capsule oral    pantoprazole (PROTONIX) 20 mg, oral, Daily before breakfast, Do not crush, chew, or split.    zolpidem (AMBIEN) 5 mg, oral, Nightly PRN      Last recorded vital:  /81   Pulse 71   Temp 36.5 °C (97.7 °F) (Temporal)   Resp 18   Wt 97.5 kg (214 lb 14.4 oz)   SpO2 95%   BMI 29.97 kg/m²     Physical exam  Physical Exam  Constitutional:       Appearance: Normal appearance.   Cardiovascular:      Rate and Rhythm: Normal rate.   Pulmonary:      Effort: Pulmonary effort is normal.      Breath sounds: Normal breath sounds.   Musculoskeletal:         General: Normal range of motion.      Cervical back: Normal range of motion.   Neurological:      Mental Status: He is alert and oriented to person, place, and time.   Psychiatric:         Mood and Affect: Mood normal.         Behavior: Behavior normal.         Thought Content: Thought content normal.         Judgment: Judgment normal.       Pertinent labs:  Prostate Specific AG   Date/Time Value Ref Range Status   03/18/2024 03:06 PM 1.07 <=4.00 ng/mL Final     Dx:  Problem List Items Addressed This Visit       Malignant neoplasm of prostate (Multi) - Primary    Cancer of prostate with intermediate recurrence risk, stage T2B-C or Wallace 7 or prostate-specific antigen (PSA) 10-20 (Multi)    Relevant Orders    Clinic Appointment Request Follow Up; JOSEPH SCOTT; [facility] [address] [facility] (Completed)   Due for labs, will call with lab results. Review of latent SE including rectal bleeding, hematuria, urinary strictures, ED where reviewed as well as how to contact office if s/s present. Admits to mild ED. Denies other latent SE. NCCN guidelines where reviewed and routine FUV of every 3m for first year and every 6m for four years for a total of five years was discussed. Patient verbalized understanding.     PLAN:  FUV 4m   Labs PSA today  and in 4m  Imaging none  FUV other providers: PCP for routine evals    Please contact office with any concerns:  Sacha Flores CNP  [phone]

## 2024-07-23 ENCOUNTER — TELEPHONE (OUTPATIENT)
Dept: RADIATION ONCOLOGY | Facility: HOSPITAL | Age: 69
End: 2024-07-23
Payer: MEDICARE

## 2024-07-23 ENCOUNTER — TELEPHONE (OUTPATIENT)
Dept: HEMATOLOGY/ONCOLOGY | Facility: HOSPITAL | Age: 69
End: 2024-07-23

## 2024-11-19 ENCOUNTER — TELEPHONE (OUTPATIENT)
Dept: RADIATION ONCOLOGY | Facility: HOSPITAL | Age: 69
End: 2024-11-19
Payer: MEDICARE

## 2024-11-20 ASSESSMENT — ENCOUNTER SYMPTOMS
JOINT SWELLING: 0
WEAKNESS: 0
ANAL BLEEDING: 0
ABDOMINAL PAIN: 0
RECTAL PAIN: 0
CHEST TIGHTNESS: 0
CONSTIPATION: 0
UNEXPECTED WEIGHT CHANGE: 0
FEVER: 0
DIFFICULTY URINATING: 0
COUGH: 0
BLOOD IN STOOL: 0
PSYCHIATRIC NEGATIVE: 1
HEMATURIA: 0
PALPITATIONS: 0
DIARRHEA: 0
SHORTNESS OF BREATH: 0
FATIGUE: 0
ARTHRALGIAS: 0
FREQUENCY: 0
BACK PAIN: 0
ALLERGIC/IMMUNOLOGIC NEGATIVE: 1
DYSURIA: 0
DIZZINESS: 0

## 2024-11-20 NOTE — PROGRESS NOTES
"Cancer synopsis:  Rad/onc: Dr. Tavarez/ [place] [facility]     69-year-old with newly diagnosed prostate adenocarcinoma,  iPSA 4.37, GG2 (6/15 cores+, 0/1 targeted core+, no evidence of cribriform).     3/29/2023 PSA 4.37     6/28/2023 MRI prostate noted a ~33cc prostate, with PI-RADS 4 lesion noted in the right posterolateral PZ, without evidence of gross MANDI, SVI, or abnormal lymphadenopathy     7/12/2023 MR targeted biopsy by Dr. Ni  noted GG2 (6/15 cores+, 0/1 targeted core+, no evidence of cribriform).     RT 12/15/2023    Recent imaging  none    History of presenting illness:    Patient ID: [id number]     Thiago Paz is a 69 y.o. male who presents for his FIR prostate cancer GG2, IPSA 4.37 now s/p RT without ADT.    RT Site: prostate and SV  RT Date: 12/15/2023  Hormone therapy: No  Hot Flushes: Denies  Fatigue: Denies  Bone pain: Denies  ED: Reports initially after RT loss of erectile function and ejaculate however notices of recent erectile fuction has returned to baseline along.   ED medications: No  IPSS: did not complete  Urinary symptoms: Denies dysuria, frequency, urgency, urine leakage of weak stream. Denies further episodes of hematuria after renal stone passage. Has stopped flowmax since renal stone passage. States \"stream feels better but different after RT\".  Urinary Medications: No  Rectal bleeding: Denies  Colonoscopy: none on file  Other systems: Denies SOB, CP, fever    Review of systems:  Review of Systems   Constitutional:  Negative for fatigue, fever and unexpected weight change.   Respiratory:  Negative for cough, chest tightness and shortness of breath.    Cardiovascular:  Negative for chest pain, palpitations and leg swelling.   Gastrointestinal:  Negative for abdominal pain, anal bleeding, blood in stool, constipation, diarrhea and rectal pain.   Endocrine: Negative for cold intolerance, heat intolerance and polyuria.   Genitourinary:  Negative for decreased urine volume, difficulty " urinating, dysuria, frequency, hematuria and urgency.        Refer to HPI   Musculoskeletal:  Negative for arthralgias, back pain, gait problem and joint swelling.   Skin: Negative.    Allergic/Immunologic: Negative.    Neurological:  Negative for dizziness, syncope and weakness.   Psychiatric/Behavioral: Negative.       Past Medical history  Past Medical History:   Diagnosis Date    A-fib (Multi)     Arrhythmia     Depression     GERD (gastroesophageal reflux disease)     Hypercholesteremia     Macular degeneration     Prostate cancer (Multi)       Surgical/family history  Family History   Problem Relation Name Age of Onset    Breast cancer Mother      Prostate cancer Father      Cancer Sister          doesn't know the type of cancer      Past Surgical History:   Procedure Laterality Date    HERNIA REPAIR      KIDNEY STONE SURGERY      MASS EXCISION      OTHER SURGICAL HISTORY      removal of Thymus gland    UPPER GASTROINTESTINAL ENDOSCOPY        Social History  Tobacco Use: Low Risk  (5/31/2024)    Received from [facility]    Patient History     Smoking Tobacco Use: Never     Smokeless Tobacco Use: Never     Passive Exposure: Not on file       Current med list:  Current Outpatient Medications   Medication Instructions    acetaminophen (TYLENOL 8 HOUR) 650 mg, Every 8 hours PRN    apixaban (ELIQUIS) 2.5 mg, 2 times daily    atorvastatin (LIPITOR) 40 mg, Daily    DULoxetine (CYMBALTA) 30 mg, Every other day    DULoxetine (CYMBALTA) 60 mg, Every other day    metoprolol tartrate (LOPRESSOR) 25 mg, 2 times daily    omega 3-dha-epa-fish oil (Fish OiL) 1,000 mg (120 mg-180 mg) capsule Take by mouth.    pantoprazole (PROTONIX) 20 mg, Daily before breakfast    tadalafil (CIALIS) 10 mg, oral, Daily PRN, Take orally PRN for sexual function.    zolpidem (AMBIEN) 5 mg, Nightly PRN      Last recorded vital:  /87   Pulse 76   Temp 36.3 °C (97.3 °F) (Temporal)   Resp 18   Wt 96.9 kg (213 lb 8.3 oz)   SpO2 97%    BMI 29.78 kg/m²     Physical exam  Physical Exam  Constitutional:       Appearance: Normal appearance.   Cardiovascular:      Rate and Rhythm: Normal rate.   Pulmonary:      Effort: Pulmonary effort is normal.      Breath sounds: Normal breath sounds.   Musculoskeletal:         General: Normal range of motion.      Cervical back: Normal range of motion.   Neurological:      Mental Status: He is alert and oriented to person, place, and time.   Psychiatric:         Mood and Affect: Mood normal.         Behavior: Behavior normal.         Thought Content: Thought content normal.         Judgment: Judgment normal.       Pertinent labs:  Prostate Specific AG   Date/Time Value Ref Range Status   07/22/2024 03:04 PM 0.88 <=4.00 ng/mL Final     Dx:  Problem List Items Addressed This Visit       Malignant neoplasm of prostate (Multi) - Primary    Relevant Orders    Clinic Appointment Request Follow Up; SACHA SCOTT (virtual); [address] (virtual) (Completed)   Due for labs, will call with lab results. Review of latent SE including rectal bleeding, hematuria, urinary strictures, ED where reviewed as well as how to contact office if s/s present. Admits to mild ED. Denies other latent SE. NCCN guidelines where reviewed and routine FUV of every 3m for first year and every 6m for four years for a total of five years was discussed. Patient verbalized understanding.     PLAN:  FUV 6m   Labs PSA today and in 6m  Imaging none  FUV other providers: PCP for routine evals    Please contact office with any concerns:  Sacha Flores CNP  [phone]

## 2024-11-21 ENCOUNTER — HOSPITAL ENCOUNTER (OUTPATIENT)
Dept: RADIATION ONCOLOGY | Facility: HOSPITAL | Age: 69
Setting detail: RADIATION/ONCOLOGY SERIES
Discharge: HOME | End: 2024-11-21
Payer: MEDICARE

## 2024-11-21 ENCOUNTER — TELEPHONE (OUTPATIENT)
Dept: RADIATION ONCOLOGY | Facility: HOSPITAL | Age: 69
End: 2024-11-21

## 2024-11-21 ENCOUNTER — LAB (OUTPATIENT)
Dept: LAB | Facility: HOSPITAL | Age: 69
End: 2024-11-21
Payer: MEDICARE

## 2024-11-21 VITALS
DIASTOLIC BLOOD PRESSURE: 87 MMHG | WEIGHT: 213.52 LBS | SYSTOLIC BLOOD PRESSURE: 143 MMHG | HEART RATE: 76 BPM | OXYGEN SATURATION: 97 % | RESPIRATION RATE: 18 BRPM | TEMPERATURE: 97.3 F | BODY MASS INDEX: 29.78 KG/M2

## 2024-11-21 DIAGNOSIS — C61 MALIGNANT NEOPLASM OF PROSTATE (MULTI): Primary | ICD-10-CM

## 2024-11-21 DIAGNOSIS — C61 CANCER OF PROSTATE WITH INTERMEDIATE RECURRENCE RISK, STAGE T2B-C OR GLEASON 7 OR PROSTATE-SPECIFIC ANTIGEN (PSA) 10-20 (MULTI): ICD-10-CM

## 2024-11-21 LAB — PSA SERPL-MCNC: 0.65 NG/ML

## 2024-11-21 PROCEDURE — 99214 OFFICE O/P EST MOD 30 MIN: CPT

## 2024-11-21 PROCEDURE — 36415 COLL VENOUS BLD VENIPUNCTURE: CPT

## 2024-11-21 PROCEDURE — 84153 ASSAY OF PSA TOTAL: CPT

## 2024-11-21 ASSESSMENT — PATIENT HEALTH QUESTIONNAIRE - PHQ9
SUM OF ALL RESPONSES TO PHQ9 QUESTIONS 1 AND 2: 0
2. FEELING DOWN, DEPRESSED OR HOPELESS: NOT AT ALL
1. LITTLE INTEREST OR PLEASURE IN DOING THINGS: NOT AT ALL

## 2024-11-21 ASSESSMENT — ENCOUNTER SYMPTOMS
DEPRESSION: 0
OCCASIONAL FEELINGS OF UNSTEADINESS: 0
LOSS OF SENSATION IN FEET: 0

## 2024-11-21 ASSESSMENT — PAIN SCALES - GENERAL: PAINLEVEL_OUTOF10: 0-NO PAIN

## 2025-03-28 ENCOUNTER — APPOINTMENT (OUTPATIENT)
Dept: OPHTHALMOLOGY | Facility: CLINIC | Age: 70
End: 2025-03-28
Payer: MEDICARE

## 2025-04-29 ASSESSMENT — ENCOUNTER SYMPTOMS
FEVER: 0
ABDOMINAL PAIN: 0
FATIGUE: 0
BACK PAIN: 0
DIZZINESS: 0
ALLERGIC/IMMUNOLOGIC NEGATIVE: 1
CHEST TIGHTNESS: 0
UNEXPECTED WEIGHT CHANGE: 0
COUGH: 0
JOINT SWELLING: 0
DIFFICULTY URINATING: 0
RECTAL PAIN: 0
CONSTIPATION: 0
PALPITATIONS: 0
HEMATURIA: 0
WEAKNESS: 0
SHORTNESS OF BREATH: 0
ANAL BLEEDING: 0
DYSURIA: 0
BLOOD IN STOOL: 0
DIARRHEA: 0
ARTHRALGIAS: 0
FREQUENCY: 0
PSYCHIATRIC NEGATIVE: 1

## 2025-04-29 NOTE — PROGRESS NOTES
"Cancer synopsis:  Rad/onc: Dr. Tavarez/ [place] [facility]     69-year-old with newly diagnosed prostate adenocarcinoma,  iPSA 4.37, GG2 (6/15 cores+, 0/1 targeted core+, no evidence of cribriform).     3/29/2023 PSA 4.37     6/28/2023 MRI prostate noted a ~33cc prostate, with PI-RADS 4 lesion noted in the right posterolateral PZ, without evidence of gross MANDI, SVI, or abnormal lymphadenopathy     7/12/2023 MR targeted biopsy by Dr. Ni  noted GG2 (6/15 cores+, 0/1 targeted core+, no evidence of cribriform).     RT 12/15/2023    Recent imaging  Being worked up thyroid nodule    History of presenting illness:    Patient ID: [id number]     Thiago Paz is a 69 y.o. male who presents for his FIR prostate cancer GG2, IPSA 4.37 now s/p RT without ADT.    RT Site: prostate and SV  RT Date: 12/15/2023  Hormone therapy: No  Hot Flushes: Denies  Fatigue: Denies  Bone pain: Denies  ED: Reports initially after RT loss of erectile function and ejaculate however notices of recent erectile fuction has returned to baseline along.   ED medications: No  IPSS: 5  Urinary symptoms: Denies dysuria, frequency, urgency, urine leakage of weak stream. Denies nocturia. Denies further episodes of hematuria after renal stone passage. Has stopped flowmax since renal stone passage. States \"stream feels better but different after RT\".  Urinary Medications: No  Rectal bleeding: Denies  Colonoscopy: none on file  Other systems: Denies SOB, CP, fever. Considering photobio-modulation for his macular degeneration here at .    Review of systems:  Review of Systems   Constitutional:  Negative for fatigue, fever and unexpected weight change.   Respiratory:  Negative for cough, chest tightness and shortness of breath.    Cardiovascular:  Negative for chest pain, palpitations and leg swelling.   Gastrointestinal:  Negative for abdominal pain, anal bleeding, blood in stool, constipation, diarrhea and rectal pain.   Endocrine: Negative for cold intolerance, " heat intolerance and polyuria.   Genitourinary:  Negative for decreased urine volume, difficulty urinating, dysuria, frequency, hematuria and urgency.        Refer to HPI   Musculoskeletal:  Negative for arthralgias, back pain, gait problem and joint swelling.   Skin: Negative.    Allergic/Immunologic: Negative.    Neurological:  Negative for dizziness, syncope and weakness.   Psychiatric/Behavioral: Negative.       Past Medical history  Past Medical History:   Diagnosis Date    A-fib (Multi)     Arrhythmia     Depression     GERD (gastroesophageal reflux disease)     Hypercholesteremia     Macular degeneration     Prostate cancer (Multi)       Surgical/family history  Family History   Problem Relation Name Age of Onset    Breast cancer Mother      Prostate cancer Father      Cancer Sister          doesn't know the type of cancer      Past Surgical History:   Procedure Laterality Date    HERNIA REPAIR      KIDNEY STONE SURGERY      MASS EXCISION      OTHER SURGICAL HISTORY      removal of Thymus gland    UPPER GASTROINTESTINAL ENDOSCOPY        Social History  Tobacco Use: Low Risk  (4/11/2025)    Received from [facility]    Patient History     Smoking Tobacco Use: Never     Smokeless Tobacco Use: Never     Passive Exposure: Not on file       Current med list:  Current Outpatient Medications   Medication Instructions    acetaminophen (TYLENOL 8 HOUR) 650 mg, Every 8 hours PRN    apixaban (ELIQUIS) 2.5 mg, 2 times daily    atorvastatin (LIPITOR) 40 mg, Daily    DULoxetine (CYMBALTA) 30 mg, Every other day    DULoxetine (CYMBALTA) 60 mg, Every other day    metoprolol tartrate (LOPRESSOR) 25 mg, 2 times daily    omega 3-dha-epa-fish oil (Fish OiL) 1,000 mg (120 mg-180 mg) capsule Take by mouth.    pantoprazole (PROTONIX) 20 mg, Daily before breakfast    tadalafil (CIALIS) 10 mg, oral, Daily PRN, Take orally PRN for sexual function.    zolpidem (AMBIEN) 5 mg, Nightly PRN      Last recorded vital:  Temp 36.4 °C  (97.5 °F) (Temporal)   Wt 93.1 kg (205 lb 3.2 oz)   BMI 28.62 kg/m²     Physical Exam  Constitutional:       Appearance: Normal appearance.   Cardiovascular:      Rate and Rhythm: Normal rate.   Pulmonary:      Effort: Pulmonary effort is normal.      Breath sounds: Normal breath sounds.   Musculoskeletal:         General: Normal range of motion.      Cervical back: Normal range of motion.   Neurological:      Mental Status: He is alert and oriented to person, place, and time.   Psychiatric:         Mood and Affect: Mood normal.         Behavior: Behavior normal.         Thought Content: Thought content normal.         Judgment: Judgment normal.       Pertinent labs:  Prostate Specific AG   Date/Time Value Ref Range Status   11/21/2024 01:34 PM 0.65 <=4.00 ng/mL Final     Dx:  Problem List Items Addressed This Visit       Malignant neoplasm of prostate (Multi) - Primary    Relevant Orders    Clinic Appointment Request Follow Up; SACHA SCOTT; [facility] (Completed)   Due for labs, will call with lab results. Review of latent SE including rectal bleeding, hematuria, urinary strictures, ED where reviewed as well as how to contact office if s/s present. Admits to mild ED. Denies other latent SE. NCCN guidelines where reviewed and routine FUV of every 3m for first year and every 6m for four years for a total of five years was discussed. Patient verbalized understanding.     PLAN:  FUV 6m   Labs PSA today and in 6m  Imaging none  FUV other providers: PCP for routine evals    Please contact office with any concerns:  Sacha Flores CNP  [phone]

## 2025-04-30 ENCOUNTER — HOSPITAL ENCOUNTER (OUTPATIENT)
Dept: RADIATION ONCOLOGY | Facility: HOSPITAL | Age: 70
Setting detail: RADIATION/ONCOLOGY SERIES
Discharge: HOME | End: 2025-04-30
Payer: MEDICARE

## 2025-04-30 ENCOUNTER — LAB (OUTPATIENT)
Dept: LAB | Facility: HOSPITAL | Age: 70
End: 2025-04-30
Payer: MEDICARE

## 2025-04-30 VITALS — BODY MASS INDEX: 28.62 KG/M2 | TEMPERATURE: 97.5 F | WEIGHT: 205.2 LBS

## 2025-04-30 DIAGNOSIS — C61 MALIGNANT NEOPLASM OF PROSTATE (MULTI): Primary | ICD-10-CM

## 2025-04-30 DIAGNOSIS — C61 MALIGNANT NEOPLASM OF PROSTATE (MULTI): ICD-10-CM

## 2025-04-30 LAB — PSA SERPL-MCNC: 0.55 NG/ML

## 2025-04-30 PROCEDURE — 84153 ASSAY OF PSA TOTAL: CPT

## 2025-04-30 PROCEDURE — 99214 OFFICE O/P EST MOD 30 MIN: CPT

## 2025-04-30 PROCEDURE — 36415 COLL VENOUS BLD VENIPUNCTURE: CPT

## 2025-04-30 ASSESSMENT — ENCOUNTER SYMPTOMS
DEPRESSION: 0
OCCASIONAL FEELINGS OF UNSTEADINESS: 0
LOSS OF SENSATION IN FEET: 0

## 2025-04-30 ASSESSMENT — PATIENT HEALTH QUESTIONNAIRE - PHQ9
SUM OF ALL RESPONSES TO PHQ9 QUESTIONS 1 AND 2: 0
1. LITTLE INTEREST OR PLEASURE IN DOING THINGS: NOT AT ALL
2. FEELING DOWN, DEPRESSED OR HOPELESS: NOT AT ALL

## 2025-04-30 ASSESSMENT — PAIN SCALES - GENERAL: PAINLEVEL_OUTOF10: 0-NO PAIN

## 2025-05-01 ENCOUNTER — TELEPHONE (OUTPATIENT)
Dept: RADIATION ONCOLOGY | Facility: HOSPITAL | Age: 70
End: 2025-05-01
Payer: MEDICARE

## 2025-05-01 NOTE — TELEPHONE ENCOUNTER
Reason for Conversation  Psa results  Background   Prostate ca    Disposition   0.55 psa still declining nicely will plan for FUV in 6m

## (undated) DEVICE — SOLUTION, IRRIGATION, STERILE WATER, 1000 ML, POUR BOTTLE

## (undated) DEVICE — DRESSING, GAUZE, SPONGE, VERSALON, ALL PURPOSE, 4 X 4 IN, SOFT

## (undated) DEVICE — CONTAINER, SPECIMEN, 4 OZ, OR PEEL PACK, STERILE

## (undated) DEVICE — TRANSPORT KIT, PROSTATE BIOPSY COLLECTION

## (undated) DEVICE — TOWEL PACK, STERILE, 4/PACK, BLUE

## (undated) DEVICE — LABELING SYSTEM, CORRECT MEDICATION, CATH LAB

## (undated) DEVICE — GEL, ULTRASOUND, AQUASONIC 100, 60 GM

## (undated) DEVICE — PROBE COVER, ULTRASOUND 8818

## (undated) DEVICE — GLOVE, PROTEXIS PI CLASSIC, SZ-7.0, PF, LF

## (undated) DEVICE — COVER, TRANSDUCER, NEO GUARD, 2.6 X 30CM, LF

## (undated) DEVICE — SYRINGE, 10 CC, LUER LOCK

## (undated) DEVICE — LUBRICANT, WATER SOLUBLE, BACTERIOSTATIC, 2 OZ, STERILE